# Patient Record
Sex: MALE | Race: WHITE
[De-identification: names, ages, dates, MRNs, and addresses within clinical notes are randomized per-mention and may not be internally consistent; named-entity substitution may affect disease eponyms.]

---

## 2017-09-26 LAB
ANION GAP SERPL CALC-SCNC: 11 MMOL/L (ref 3–11)
APPEARANCE UR: CLEAR
BASOPHILS # BLD: 0.01 K/UL (ref 0–0.2)
BASOPHILS NFR BLD: 0.2 %
BILIRUB UR-MCNC: (no result) MG/DL
BUN SERPL-MCNC: 10 MG/DL (ref 7–18)
BUN/CREAT SERPL: 14.6 (ref 10–20)
CALCIUM SERPL-MCNC: 9 MG/DL (ref 8.5–10.1)
CHLORIDE SERPL-SCNC: 105 MMOL/L (ref 98–107)
CO2 SERPL-SCNC: 24 MMOL/L (ref 21–32)
COLOR UR: YELLOW
COMPLETE: YES
CREAT CL PREDICTED SERPL C-G-VRATE: 142.7 ML/MIN
CREAT SERPL-MCNC: 0.67 MG/DL (ref 0.6–1.4)
EOSINOPHIL NFR BLD AUTO: 206 K/UL (ref 130–400)
GLUCOSE SERPL-MCNC: 84 MG/DL (ref 70–99)
HCT VFR BLD CALC: 44.4 % (ref 42–52)
IG%: 0.4 %
IMM GRANULOCYTES NFR BLD AUTO: 31.8 %
INR PPP: 1 (ref 0.9–1.1)
LYMPHOCYTES # BLD: 1.78 K/UL (ref 1.2–3.4)
MANUAL MICROSCOPIC REQUIRED?: NO
MCH RBC QN AUTO: 30 PG (ref 25–34)
MCHC RBC AUTO-ENTMCNC: 33.6 G/DL (ref 32–36)
MCV RBC AUTO: 89.3 FL (ref 80–100)
MONOCYTES NFR BLD: 8.8 %
NEUTROPHILS # BLD AUTO: 1.8 %
NEUTROPHILS NFR BLD AUTO: 57 %
NITRITE UR QL STRIP: (no result)
PARTIAL THROMBOPLASTIN RATIO: 1.1
PH UR STRIP: 7 [PH] (ref 4.5–7.5)
PMV BLD AUTO: 8.6 FL (ref 7.4–10.4)
POTASSIUM SERPL-SCNC: 3.8 MMOL/L (ref 3.5–5.1)
PROTHROMBIN TIME: 10.8 SECONDS (ref 9–12)
RBC # BLD AUTO: 4.97 M/UL (ref 4.7–6.1)
REVIEW REQ?: NO
SODIUM SERPL-SCNC: 140 MMOL/L (ref 136–145)
SP GR UR STRIP: 1.02 (ref 1–1.03)
URINE BILL WITH OR WITHOUT MIC: (no result)
UROBILINOGEN UR-MCNC: (no result) MG/DL
WBC # BLD AUTO: 5.59 K/UL (ref 4.8–10.8)
ZZUR CULT IF INDIC CLEAN CATCH: NO

## 2017-09-26 NOTE — PAT MEDICATION INSTRUCTIONS
Service Date


Sep 26, 2017.





Current Home Medication List


Amoxicillin (Amoxil), 4 CAP PO AS DIRECTED PRN for dental


Aspirin (Aspirin Chewable), 81 MG PO QAM


Diphenhydramine Hcl (Benadryl Allergy), 1 CAP PO HS


Fish Oil ([place]-3), 1 CAP PO QAM


Multiple Vitamins W/ Minerals (Centrum Silver Ultra Mens), 1 TAB PO QAM


Simvastatin (Zocor), 20 MG PO QPM


Tadalafil (Cialis), 1 TAB PO QAM





Medication Instructions


For Your Scheduled Surgery 





- Hold the following medications 2 weeks prior to surgery:


Fish Oil ([place]-3), 1 CAP PO QAM








-Continue as directed:


Amoxicillin (Amoxil), 4 CAP PO AS DIRECTED PRN for dental








- Hold the following medications the morning of surgery:


Multiple Vitamins W/ Minerals (Centrum Silver Ultra Mens), 1 TAB PO QAM








- Take the following medications the morning of surgery with a sip of water:


Aspirin (Aspirin Chewable), 81 MG PO QAM


Tadalafil (Cialis), 1 TAB PO QAM








- Take the following medications as scheduled the night before surgery:


Diphenhydramine Hcl (Benadryl Allergy), 1 CAP PO HS


Simvastatin (Zocor), 20 MG PO QPM











If you have any questions please call us at [phone] or [phone] or 

[phone]

## 2017-09-26 NOTE — DIAGNOSTIC IMAGING REPORT
CHEST PREADMISSION(PA/LAT)



HISTORY:  57 years-old Male PAT preadmission exam. No acute chest complaints.

Initial exam.



COMPARISON: None available.



TECHNIQUE: Frontal and lateral views of the chest.



FINDINGS: 

Cardiac silhouette is upper limits of normal. There is no pneumothorax, pleural

effusion or focal airspace consolidation. No overt pulmonary edema.



The bones are grossly intact. 



IMPRESSION: No acute cardiopulmonary process. 







The above report was generated using voice recognition software. It may contain

grammatical, syntax or spelling errors.







Electronically signed by:  Emigdio Davis M.D.

9/26/2017 2:56 PM



Dictated Date/Time:  9/26/2017 2:55 PM

## 2017-09-27 LAB — EST. AVERAGE GLUCOSE BLD GHB EST-MCNC: 117 MG/DL

## 2017-10-24 NOTE — HISTORY & PHYSICAL EXAMINATION
DATE OF ADMISSION:  10/25/2017

 

ADMISSION HISTORY AND PHYSICAL

 

CHIEF COMPLAINT:  Chronic left knee pain.

 

HISTORY OF PRESENT ILLNESS:  This is a 57-year-old male patient of Dr. Kohler's complaining of chronic left knee pain, longstanding, now

progressively getting worse.  He has failed conservative treatment including

intra-articular injections and anti-inflammatories.  The patient has

increased pain with weightbearing activities and his pain does interfere with

his activities of daily living.

 

PAST MEDICAL HISTORY:  He was born with a congenital heart disease, heart

murmur, heart valve problem, hypercholesterolemia, irregular heartbeat, sleep

apnea with the use of CPAP, osteoarthritis, spine problems, neck problems,

and obesity.

 

SOCIAL HISTORY:  Nonsmoker, nondrinker.

 

FAMILY HISTORY:  Noncontributory.

 

REVIEW OF SYSTEMS:  The patient complains of chronic left knee pain,

otherwise denies any shortness of breath, chest pain, nausea, vomiting or any

other joint complaints.

 

PAST SURGICAL HISTORY:  Shoulder surgery, knee surgery, back surgery.

 

MEDICATIONS:  Simvastatin 20 mg daily at bedtime, Cialis 5 mg daily as

needed, aspirin 81 mg daily, amoxicillin only prophylactically for dental

work, fish oil 1000 mg daily, Centrum multivitamin daily.

 

ALLERGIES:  OXYCODONE WHICH CAUSES puritis

 

PHYSICAL EXAMINATION:

GENERAL:  Well-developed, well-nourished 57-year-old male in no acute

distress.  He is alert and oriented x3 and pleasant.

HEENT:  Normocephalic, atraumatic.  Extraocular motions are intact.  Pupils

are equal and reactive to light.

HEART:  Regular rate and rhythm with a 1/6 murmur appreciated.

LUNGS:  Clear.

ABDOMEN:  Soft and nontender, bowel sounds are present.

EXTREMITIES:  Left knee reveals medial joint line tenderness with a varus

deformity.  He has crepitation and pain with passive range of motion.  He has

limited range of motion of 0-120 degrees.  He has 5/5 strength.

NEUROLOGIC:  Neurovascularly, he is intact in his left lower extremity.

 

DIAGNOSES:  Left knee end-stage osteoarthritis.  He has a congenital heart

problem, heart murmur, heart valve problem, hypercholesterolemia, irregular

heartbeat, sleep apnea with the use of CPAP, osteoarthritis, spine problems,

neck problems, and obesity.

 

PLAN:  The patient was advised of his diagnosis.  Indications, risks,

benefits, and postop course have all been reviewed.  The patient wishes to

proceed with a left total knee arthroplasty.  Necessary consent forms,

preoperative testing and clearances will be obtained.

 

 

 

[facility]

## 2017-10-25 ENCOUNTER — HOSPITAL ENCOUNTER (INPATIENT)
Dept: HOSPITAL 45 - C.ACU | Age: 57
LOS: 2 days | Discharge: HOME | DRG: 470 | End: 2017-10-27
Attending: ORTHOPAEDIC SURGERY | Admitting: ORTHOPAEDIC SURGERY
Payer: COMMERCIAL

## 2017-10-25 VITALS
DIASTOLIC BLOOD PRESSURE: 74 MMHG | SYSTOLIC BLOOD PRESSURE: 165 MMHG | OXYGEN SATURATION: 94 % | HEART RATE: 78 BPM | TEMPERATURE: 97.88 F

## 2017-10-25 VITALS
HEART RATE: 65 BPM | DIASTOLIC BLOOD PRESSURE: 81 MMHG | TEMPERATURE: 97.88 F | OXYGEN SATURATION: 95 % | SYSTOLIC BLOOD PRESSURE: 144 MMHG

## 2017-10-25 VITALS
SYSTOLIC BLOOD PRESSURE: 140 MMHG | TEMPERATURE: 98.24 F | OXYGEN SATURATION: 93 % | DIASTOLIC BLOOD PRESSURE: 100 MMHG | HEART RATE: 97 BPM

## 2017-10-25 VITALS
DIASTOLIC BLOOD PRESSURE: 81 MMHG | HEART RATE: 65 BPM | TEMPERATURE: 97.7 F | SYSTOLIC BLOOD PRESSURE: 166 MMHG | OXYGEN SATURATION: 95 %

## 2017-10-25 VITALS — OXYGEN SATURATION: 96 % | DIASTOLIC BLOOD PRESSURE: 80 MMHG | SYSTOLIC BLOOD PRESSURE: 147 MMHG | HEART RATE: 64 BPM

## 2017-10-25 VITALS
DIASTOLIC BLOOD PRESSURE: 87 MMHG | OXYGEN SATURATION: 99 % | HEART RATE: 84 BPM | SYSTOLIC BLOOD PRESSURE: 139 MMHG | TEMPERATURE: 98.06 F

## 2017-10-25 VITALS — OXYGEN SATURATION: 92 % | SYSTOLIC BLOOD PRESSURE: 158 MMHG | HEART RATE: 61 BPM | DIASTOLIC BLOOD PRESSURE: 82 MMHG

## 2017-10-25 VITALS
OXYGEN SATURATION: 94 % | TEMPERATURE: 97.88 F | DIASTOLIC BLOOD PRESSURE: 83 MMHG | HEART RATE: 103 BPM | SYSTOLIC BLOOD PRESSURE: 169 MMHG

## 2017-10-25 VITALS
WEIGHT: 246.26 LBS | HEIGHT: 66 IN | BODY MASS INDEX: 39.58 KG/M2 | HEIGHT: 66 IN | WEIGHT: 246.26 LBS | BODY MASS INDEX: 39.58 KG/M2

## 2017-10-25 VITALS — DIASTOLIC BLOOD PRESSURE: 79 MMHG | HEART RATE: 88 BPM | SYSTOLIC BLOOD PRESSURE: 148 MMHG

## 2017-10-25 VITALS — DIASTOLIC BLOOD PRESSURE: 79 MMHG | SYSTOLIC BLOOD PRESSURE: 157 MMHG

## 2017-10-25 DIAGNOSIS — M17.0: Primary | ICD-10-CM

## 2017-10-25 DIAGNOSIS — E78.00: ICD-10-CM

## 2017-10-25 DIAGNOSIS — G47.33: ICD-10-CM

## 2017-10-25 DIAGNOSIS — Z83.3: ICD-10-CM

## 2017-10-25 DIAGNOSIS — Z79.899: ICD-10-CM

## 2017-10-25 DIAGNOSIS — E66.9: ICD-10-CM

## 2017-10-25 DIAGNOSIS — Z79.82: ICD-10-CM

## 2017-10-25 DIAGNOSIS — Z80.42: ICD-10-CM

## 2017-10-25 DIAGNOSIS — Q23.1: ICD-10-CM

## 2017-10-25 DIAGNOSIS — E78.5: ICD-10-CM

## 2017-10-25 PROCEDURE — 0SRD0J9 REPLACEMENT OF LEFT KNEE JOINT WITH SYNTHETIC SUBSTITUTE, CEMENTED, OPEN APPROACH: ICD-10-PCS | Performed by: ORTHOPAEDIC SURGERY

## 2017-10-25 RX ADMIN — SIMVASTATIN SCH MG: 20 TABLET, FILM COATED ORAL at 20:52

## 2017-10-25 RX ADMIN — DEXTROSE MONOHYDRATE, SODIUM CHLORIDE, AND POTASSIUM CHLORIDE SCH MLS/HR: 50; 4.5; 1.49 INJECTION, SOLUTION INTRAVENOUS at 20:51

## 2017-10-25 RX ADMIN — FERROUS GLUCONATE SCH MG: 324 TABLET ORAL at 18:42

## 2017-10-25 RX ADMIN — SODIUM CHLORIDE SCH MG: 9 INJECTION INTRAMUSCULAR; INTRAVENOUS; SUBCUTANEOUS at 18:41

## 2017-10-25 RX ADMIN — CEFAZOLIN SCH MLS/MIN: 10 INJECTION, POWDER, FOR SOLUTION INTRAVENOUS at 16:19

## 2017-10-25 RX ADMIN — DEXTROSE MONOHYDRATE, SODIUM CHLORIDE, AND POTASSIUM CHLORIDE SCH MLS/HR: 50; 4.5; 1.49 INJECTION, SOLUTION INTRAVENOUS at 11:09

## 2017-10-25 RX ADMIN — DOCUSATE SODIUM SCH MG: 100 CAPSULE, LIQUID FILLED ORAL at 20:52

## 2017-10-25 RX ADMIN — Medication SCH MG: at 20:52

## 2017-10-25 RX ADMIN — FERROUS GLUCONATE SCH MG: 324 TABLET ORAL at 13:07

## 2017-10-25 RX ADMIN — STANDARDIZED SENNA CONCENTRATE SCH MG: 8.6 TABLET ORAL at 20:52

## 2017-10-25 RX ADMIN — SODIUM CHLORIDE SCH MG: 9 INJECTION INTRAMUSCULAR; INTRAVENOUS; SUBCUTANEOUS at 11:40

## 2017-10-25 NOTE — MEDICAL CONSULT
Consultation


Date of Consultation:


Oct 25, 2017.


Attending Physician:


Clif Kohler M.D.


Reason for Consultation:


Post Op Medical Management


History of Present Illness


57 year old male who is s/p left TKA today by Dr. Kohler. Patient reports 

increasing knee pain for the past several years. He failed outpatient 

conservative measures and therefore presented for the planned procedure today. 

Post operatively the patient is doing well. He reports his pain is well 

controlled. He reports some mild residual tingling to the BLLE. He denies chest 

pain and shortness of breath. No lightheadedness or dizziness. He denies 

abdominal pain and nausea. He reports he has no voided yet since surgery.





Past Medical/Surgical History


Medical Problems:


(1) Aortic regurgitation


Status: Chronic  





(2) HLD (hyperlipidemia)


Status: Chronic  





(3) [place] on CPAP


Status: Chronic  





Surgical Problems:


(1) H/O arthroscopic knee surgery


Status: Chronic  





(2) H/O laminectomy


Status: Chronic  





(3) H/O vasectomy


Status: Chronic  





(4) History of hemorrhoidectomy


Status: Chronic  





(5) S/P rotator cuff repair


Status: Chronic  








Family History





Diabetes mellitus


  FATHER


FH: prostate cancer


  FATHER





Social History


Smoking Status:  Never Smoker


Alcohol Use:  ~ 5 drinks / week


Marital Status:  


Occupation Status:  employed





Allergies


Coded Allergies:  


     Oxycodone (Verified  Adverse Reaction, Unknown, hallucinations, 10/25/17)





Home Medications


Benadryl Allergy (Diphenhydramine Hcl) 25 Mg Cap 1 Cap PO HS 30 Days


Centrum Silver Ultra Mens (Multiple Vitamins W/ Minerals) 1 Tab Tab 1 Tab PO QAM


Omega-3 (Fish Oil) 1 Ea Cap 1 Cap PO QAM


Amoxil (Amoxicillin) 500 Mg Cap 4 Cap PO AS DIRECTED PRN 10 Days


Aspirin Chewable (Aspirin) 81 Mg Chew 81 Mg PO QAM


Cialis (Tadalafil) 5 Mg Tab 1 Tab PO QAM 30 Days


Zocor (Simvastatin) 20 Mg Tab 20 Mg PO QPM





Current Inpatient Medications





Current Inpatient Medications








 Medications


  (Trade)  Dose


 Ordered  Sig/Vaishnavi


 Route  Start Time


 Stop Time Status Last Admin


Dose Admin


 


 Simvastatin


  (Zocor Tab)  20 mg  QPM


 PO  10/25/17 21:00


 11/24/17 20:59   


 


 


 Morphine Sulfate


  (MoRPHine


 SULFATE INJ)  2 mg  Q4HWA  PRN


 IV  10/25/17 09:30


 11/8/17 09:29   


 


 


 Potassium


 Chloride/Dextrose/


 Sod Cl  1,000 ml @ 


 100 mls/hr  Q10H


 IV  10/25/17 11:00


 10/26/17 09:28  10/25/17 11:09


100 MLS/HR


 


 Cefazolin Sodium


 2000 mg/Syringe  10 ml @ 


 2.5 mls/min  Q8H


 IV  10/25/17 16:00


 10/26/17 00:03   


 


 


 Ketorolac


 Tromethamine


  (Toradol Inj)  30 mg  Q6H


 IV.  10/25/17 12:00


 10/26/17 09:29   


 


 


 Celecoxib


  (CeleBREX CAP)  200 mg  BID


 PO  10/26/17 21:00


 11/25/17 20:59   


 


 


 Acetaminophen/


 Hydrocodone Bitart


  (Norco 5/325 Tab)  1 TABLET


 FOR PAIN


 RATING...  Q4H  PRN


 PO  10/25/17 09:30


 11/8/17 09:29   


 


 


 Magnesium


 Hydroxide


  (Milk Of


 Magnesia Susp)  30 ml  Q6H  PRN


 PO  10/25/17 09:30


 11/24/17 09:29   


 


 


 Bisacodyl


  (Dulcolax Supp)  10 mg  DAILY  PRN


 [place]  10/25/17 09:30


 11/24/17 09:29   


 


 


 Senna


  (Senokot Tab)  17.2 mg  HS


 PO  10/25/17 21:00


 11/24/17 20:59   


 


 


 Docusate Sodium


  (coLACE CAP)  100 mg  BID


 PO  10/25/17 21:00


 11/24/17 20:59   


 


 


 Al Hydrox/Mg


 Hydrox/Simethicone


  (Maalox Max Susp)  15 ml  Q4H  PRN


 PO  10/25/17 09:30


 11/24/17 09:29   


 


 


 Multivitamins


  (Multivitamin


 Tab)  1 tab  QAM


 PO  10/26/17 09:00


 11/25/17 08:59   


 


 


 Ondansetron HCl


  (Zofran Inj)  4 mg  Q6H  PRN


 IV  10/25/17 09:30


 11/24/17 09:29   


 


 


 Ferrous Gluconate


  (Ferrous


 Gluconate Tab)  324 mg  TIDM


 PO  10/25/17 12:30


 11/24/17 12:29   


 


 


 Pantoprazole


 Sodium


  (Protonix Tab)  40 mg  QAM


 PO  10/26/17 09:00


 11/25/17 08:59   


 


 


 Tamsulosin HCl


  (Flomax Cap)  0.4 mg  QAM  PRN


 PO  10/25/17 09:30


 11/24/17 09:29   


 


 


 Tramadol HCl


  (Ultram Tab)  1 tablet


 for pain


 rating...  Q4H  PRN


 PO  10/25/17 09:30


 11/24/17 09:29   


 


 


 Aspirin


  (Ecotrin Tab)  81 mg  BID


 PO  10/25/17 21:00


 11/24/17 20:59   


 


 


 Morphine Sulfate


  (MoRPHine


 SULFATE INJ)  4 mg  Q4HWA  PRN


 IV  10/25/17 10:45


 11/8/17 10:44   


 


 


 Morphine Sulfate


  (MoRPHine


 SULFATE INJ)  6 mg  Q4HWA  PRN


 IV  10/25/17 10:45


 11/8/17 10:44   


 











Review of Systems


ROS per HPI, all other systems reviewed and negative





Physical Exam











  Date Time  Temp Pulse Resp B/P (MAP) Pulse Ox O2 Delivery O2 Flow Rate FiO2


 


10/25/17 11:06  61 16 158/82 (107) 92 Room Air  


 


10/25/17 10:40  64 16 147/80 (102) 96 Room Air  


 


10/25/17 10:00 36.1 65 19 138/76 95 Nasal Cannula 2 


 


10/25/17 09:50  66 16 144/74 94 Nasal Cannula 2 


 


10/25/17 09:40  70 17 143/78 95 Nasal Cannula 2 


 


10/25/17 09:30  72 22 132/76 99 Oxymask 10 


 


10/25/17 09:24 36.9 70 16 125/70 98 Oxymask 10 


 


10/25/17 05:46 36.5 65 20 166/81 95 Room Air  








General Appearance:  WD/WN, no apparent distress


Head:  normocephalic, atraumatic


Eyes:  normal inspection, EOMI, sclerae normal


ENT:  hearing grossly normal, + pertinent finding (moist mucous membranes)


Neck:  supple, no JVD, trachea midline


Respiratory/Chest:  lungs clear, normal breath sounds, no respiratory distress


Cardiovascular:  regular rate, rhythm, no edema, normal peripheral pulses


Abdomen/GI:  normal bowel sounds, non tender, soft, no organomegaly


Extremities/Musculoskelatal:  normal capillary refill, no pedal edema, + 

pertinent finding (s/p left knee surgery, surgical dressing intact, drain in 

place draining bloody drainage, CSM checks intact to LLE)


Neurologic/Psych:  no motor/sensory deficits, alert, normal mood/affect, 

oriented x 3


Skin:  normal color, warm/dry





Assessment & Plan


S/P LEFT TKA


- POD#0


- activity and wound care orders as per ortho


- pain control with bowel regimen


- PT/OT


- monitor H/H for acute blood loss anemia and transfuse blood products PRN





[place] ON CPAP


- patient declining to use CPAP while hospitalized 


- discussed with patient that he may need it while utilizing narcotics for pain 

control 


- will try nocturnal O2 for now 





HLD


- continue statin 


- holding fish oil 





AORTIC REGURGITATION


- no acute issues 





DVT PROPHYLAXIS


- ASA 81mg BID per ortho 





Thank you for this consultation.


We will follow the patient with you during their hospital stay.


You can reach a member of the [facility] Hospitalist Team 24/7 via pager @ [phone].





ADDENDUM:





This is a 57 year old male with a PMH of HLD, [place] on CPAP - presented for L 

knee TKA


Doing well post-operatively


No pain


Good PO intake


No BM yet


Denies fevers/chills or any other symptoms


PT/OT as per ortho


aspirin 81mg BID


hold fish oils, monitor H/H

## 2017-10-25 NOTE — DIAGNOSTIC IMAGING REPORT
TWO VIEWS LEFT KNEE



CLINICAL HISTORY:  Postoperative examination.



FINDINGS: AP and crosstable lateral portable views of the left knee are

obtained. A left knee arthroplasty is in near anatomic alignment. There has been

undersurface remodeling of the patella. No acute fracture is seen. There are

expected postoperative changes around the knee including skin clips, a surgical

drain, soft tissue edema, and subcutaneous gas.



IMPRESSION: Expected postoperative changes status post left knee arthroplasty.

No acute fracture is seen.







Electronically signed by:  Gil Corrales M.D.

10/25/2017 10:00 AM



Dictated Date/Time:  10/25/2017 10:00 AM

## 2017-10-25 NOTE — OPERATIVE REPORT
DATE OF OPERATION:  10/25/2017

 

INDICATION FOR PROCEDURE:  The patient is a 57-year-old male who presents

with progressive osteoarthritis in both of his knees with the left knee more

painful than the right.  Radiographs demonstrate he has bilateral knee medial

compartment OA.  He is clearly bone on bone on the left knee and close to if 
not bone on bone in

the medial compartment on the right knee as well.  He has already had

previous arthroscopic surgery on the left knee.  Now presents for total knee

replacement.

 

PREOPERATIVE DIAGNOSIS:  End-stage osteoarthritis, left knee.

 

POSTOPERATIVE DIAGNOSIS:  Same.

 

PROCEDURE:  Left total knee arthroplasty.

 

SURGEON:  Dr. Kohler.

 

ASSISTANT:  GENE Curran.

 

ANESTHESIA:  Spinal adductor nerve block sedation and Orthomix.

 

OPERATIVE PROCEDURE:  The patient was taken to the operating room,

anesthetized under anesthesia as dictated.  He was placed supine on the

operating room table.  Pneumatic tourniquet was placed in the left upper

thigh.  Left lower extremity was prepped and draped in sterile fashion.  Exam

demonstrated that he did not have an effusion.  He had scars from previous

arthroscopic surgery.  He did not have any pseudolaxity medially, had a varus

knee and 0 through 120 degrees of range of motion.  The left lower extremity

was sterilely prepped and draped with ChloraPrep.  The leg was elevated,

exsanguinated with Esmarch bandage.  Pneumatic tourniquet was raised to 325

mmHg.  Anterior incision made across the left knee.  Skin was incised

sharply.  The patient did have some thickened prepatellar bursa which was

resected.  Subcutaneous flaps were elevated.  Incision was made through

medial retinaculum, extended up in the mid third of the quadriceps tendon,

extending down to the medial tibial tubercle.  Intraarticular findings

demonstrated he had some tricompartmental DJD but mainly medial compartment

was bone on bone and some grade 3 medial patellofemoral DJD.  I used the

[employer] Nephew Journey 2.0 total knee arthroplasty system using CleanTiee MR

templating.  He was templated for a 5 femur and 4 tibia, but we sized the

tibia for a 5 intraoperatively.  The knee was exposed by excising the

infrapatellar fat pad, the fat pad over the anterior femur for placement of

the component in that area.  I released the lateral synovial bands.  The

cruciate ligaments were resected.  He already had a medial meniscectomy

performed, so the remainder of the meniscal remnants on the medial side were

resected and the lateral meniscus was resected.  We had to do medial releases

around the medial tibial plateau to balance the ligaments due to tight medial

compartment.  The femur was exposed.  The custom femoral cutting block was

pinned in position and the distal femoral cut was made.  Then the 5-in-1

cutting block was placed and anterior, posterior and chamfer cuts were made

for a size 5 femur.  All osteophytes were removed.  Then the knee was

extended and a subperiosteal peel lateral release was performed around the

patella and the patient had an oval patella.  So we went ahead with an oval

component for the patella.  The width was measured and width was reproduced

using a freehand cut technique and we used the 32 oval patella, made the

drill holes for the patella.  The tibia was then exposed and subluxed and the

tibial cutting block was pinned in position and the proximal tibial cut was

made.  Then the tibia was sized for a 5 tibia component.  This was externally

rotated in line with the tibial tubercle and pinned in position and the punch

for the stem was used.  Then the 5 implant was centered and inserted fully

and then the notch cutting devices were used and the collet was placed.  Then

we assessed ligamentous balance.  There was too much asymmetry with lax

lateral collateral ligament ____ the medial side, even for the asymmetrical

component being placed.  So we went ahead and used the lamina  and

pie crusted the MCL to get better ligamentous balance.  At this point, we

placed a 13 insert and we tried the high flex insert technique through range

of motion and there was a little bit of laxity in flexion of the MCL, but had

complete stability in extension side to side, used a constrained component to

prevent any mid flexion or flexion instability.  The 13 constrained had

balanced ligaments through full range of motion.  The patella just had a

little slight liftoff at the maximum deflexion, so we went ahead and did a

lateral release, leaving the synovium intact and then the patella tracked

centrally.  The trials were removed.  The Orthomix anesthetic cocktail was

injected per protocol.  The knee was copiously irrigated with pulsatile

lavage antibiotic solution with bacitracin.  Then the final components were

cemented with Simplex G cement.  The final components were the Smith [employer] Nephew

Journey 2.0 Oxinium left posterior stabilized 5 femoral component, the 5

tibial component and the 13 constrained polyethylene in the tibia and a 32

oval patella.  All cement cured, the Betadine soak was used.  The knee was

copiously irrigated with antibiotic solution and bacitracin.  Two drains were

brought out laterally.  Then the quadriceps tendon and medial retinaculum

were closed with interrupted figure-of-eight #1 Vicryl sutures.  Subcutaneous

tissues were closed with interrupted 2-0 Vicryl sutures, skin was closed with

staples.  Sterile dressings were applied.  We did do a closure over 2 Hemovac

drains which were brought out laterally.  The repair was secured through full

range of motion.  The patient tolerated the procedure well.  He had minimal

blood loss.  GENE Curran was my first assistant.  He functioned as

first assistant for the entire procedure.  He assisted in soft tissue

retraction, instrument management and performed the fascial, subcutaneous and

skin closure.  He will participate in postoperative care of the patient.

 

 

I attest to the content of the Intraoperative Record and any orders documented 
therein. Any exceptions are noted below.

 

 

 

SILVINO

## 2017-10-25 NOTE — ANESTHESIOLOGY PROGRESS NOTE
Anesthesia Post Op Note


Date & Time


Oct 25, 2017 at 09:59





Vital Signs


Pain Intensity:  0





Vital Signs Past 12 Hours








  Date Time  Temp Pulse Resp B/P (MAP) Pulse Ox O2 Delivery O2 Flow Rate FiO2


 


10/25/17 09:50  66 16 144/74 94 Nasal Cannula 2 


 


10/25/17 09:40  70 17 143/78 95 Nasal Cannula 2 


 


10/25/17 09:30  72 22 132/76 99 Oxymask 10 


 


10/25/17 09:24 36.9 70 16 125/70 98 Oxymask 10 


 


10/25/17 05:46 36.5 65 20 166/81 95 Room Air  











Notes


Mental Status:  alert / awake / arousable, participated in evaluation


Pt Amnestic to Procedure:  Yes


Nausea / Vomiting:  adequately controlled


Pain:  adequately controlled


Airway Patency, RR, SpO2:  stable & adequate


BP & HR:  stable & adequate


Hydration State:  stable & adequate


Neuraxial Anesthesia:  was administered, sensory block is resolving


Anesthetic Complications:  no major complications apparent


Doing well. VSS. Pain controlled. Ready for d/c from PACU

## 2017-10-25 NOTE — [facility] POST OPERATIVE BRIEF NOTE
Immediate Operative Summary


Operative Date


Oct 25, 2017.





Pre-Operative Diagnosis





Left Knee Degenerative Joint Disease





Post-Operative Diagnosis





Left Knee Degenerative Joint Disease





Procedure(s) Performed





Left Total Knee Arthroplasty





Surgeon


Dr. Kohler





Assistant Surgeon(s)


GENE Curran





Estimated Blood Loss


5 ml





Findings


djd oa varus grade 4 medial grade 3 medial patella





Specimens





A. Left Knee Bone and Tissue





Drains


2 hemovac





Anesthesia


spinal sedation adductor block orthomix





Complication(s)


None





Disposition


Recovery Room / PACU

## 2017-10-26 VITALS
OXYGEN SATURATION: 96 % | HEART RATE: 58 BPM | SYSTOLIC BLOOD PRESSURE: 163 MMHG | DIASTOLIC BLOOD PRESSURE: 85 MMHG | TEMPERATURE: 97.52 F

## 2017-10-26 VITALS — DIASTOLIC BLOOD PRESSURE: 78 MMHG | SYSTOLIC BLOOD PRESSURE: 140 MMHG

## 2017-10-26 VITALS
DIASTOLIC BLOOD PRESSURE: 67 MMHG | OXYGEN SATURATION: 97 % | HEART RATE: 60 BPM | TEMPERATURE: 97.7 F | SYSTOLIC BLOOD PRESSURE: 152 MMHG

## 2017-10-26 VITALS
TEMPERATURE: 96.26 F | HEART RATE: 64 BPM | DIASTOLIC BLOOD PRESSURE: 64 MMHG | SYSTOLIC BLOOD PRESSURE: 146 MMHG | OXYGEN SATURATION: 96 %

## 2017-10-26 VITALS
DIASTOLIC BLOOD PRESSURE: 81 MMHG | TEMPERATURE: 97.52 F | HEART RATE: 63 BPM | SYSTOLIC BLOOD PRESSURE: 147 MMHG | OXYGEN SATURATION: 98 %

## 2017-10-26 VITALS
SYSTOLIC BLOOD PRESSURE: 146 MMHG | DIASTOLIC BLOOD PRESSURE: 83 MMHG | TEMPERATURE: 97.52 F | HEART RATE: 66 BPM | OXYGEN SATURATION: 98 %

## 2017-10-26 VITALS
OXYGEN SATURATION: 95 % | HEART RATE: 94 BPM | TEMPERATURE: 97.88 F | DIASTOLIC BLOOD PRESSURE: 83 MMHG | SYSTOLIC BLOOD PRESSURE: 146 MMHG

## 2017-10-26 VITALS — OXYGEN SATURATION: 96 %

## 2017-10-26 LAB
ANION GAP SERPL CALC-SCNC: 8 MMOL/L (ref 3–11)
BUN SERPL-MCNC: 9 MG/DL (ref 7–18)
BUN/CREAT SERPL: 11.6 (ref 10–20)
CALCIUM SERPL-MCNC: 8.2 MG/DL (ref 8.5–10.1)
CHLORIDE SERPL-SCNC: 106 MMOL/L (ref 98–107)
CO2 SERPL-SCNC: 24 MMOL/L (ref 21–32)
CREAT CL PREDICTED SERPL C-G-VRATE: 127.5 ML/MIN
CREAT SERPL-MCNC: 0.75 MG/DL (ref 0.6–1.4)
EOSINOPHIL NFR BLD AUTO: 185 K/UL (ref 130–400)
GLUCOSE SERPL-MCNC: 138 MG/DL (ref 70–99)
HCT VFR BLD CALC: 37.6 % (ref 42–52)
MCH RBC QN AUTO: 31 PG (ref 25–34)
MCHC RBC AUTO-ENTMCNC: 34.6 G/DL (ref 32–36)
MCV RBC AUTO: 89.5 FL (ref 80–100)
PMV BLD AUTO: 9 FL (ref 7.4–10.4)
POTASSIUM SERPL-SCNC: 4.1 MMOL/L (ref 3.5–5.1)
RBC # BLD AUTO: 4.2 M/UL (ref 4.7–6.1)
SODIUM SERPL-SCNC: 139 MMOL/L (ref 136–145)
WBC # BLD AUTO: 13.63 K/UL (ref 4.8–10.8)

## 2017-10-26 RX ADMIN — HYDROCODONE BITARTRATE AND ACETAMINOPHEN PRN TAB: 5; 325 TABLET ORAL at 17:41

## 2017-10-26 RX ADMIN — FERROUS GLUCONATE SCH MG: 324 TABLET ORAL at 08:33

## 2017-10-26 RX ADMIN — DEXTROSE MONOHYDRATE, SODIUM CHLORIDE, AND POTASSIUM CHLORIDE SCH MLS/HR: 50; 4.5; 1.49 INJECTION, SOLUTION INTRAVENOUS at 05:52

## 2017-10-26 RX ADMIN — HYDROCODONE BITARTRATE AND ACETAMINOPHEN PRN TAB: 5; 325 TABLET ORAL at 12:25

## 2017-10-26 RX ADMIN — Medication SCH TAB: at 08:34

## 2017-10-26 RX ADMIN — STANDARDIZED SENNA CONCENTRATE SCH MG: 8.6 TABLET ORAL at 21:27

## 2017-10-26 RX ADMIN — SODIUM CHLORIDE SCH MG: 9 INJECTION INTRAMUSCULAR; INTRAVENOUS; SUBCUTANEOUS at 00:20

## 2017-10-26 RX ADMIN — SIMVASTATIN SCH MG: 20 TABLET, FILM COATED ORAL at 21:26

## 2017-10-26 RX ADMIN — FERROUS GLUCONATE SCH MG: 324 TABLET ORAL at 17:41

## 2017-10-26 RX ADMIN — SODIUM CHLORIDE SCH MG: 9 INJECTION INTRAMUSCULAR; INTRAVENOUS; SUBCUTANEOUS at 05:52

## 2017-10-26 RX ADMIN — PANTOPRAZOLE SCH MG: 40 TABLET, DELAYED RELEASE ORAL at 08:34

## 2017-10-26 RX ADMIN — DOCUSATE SODIUM SCH MG: 100 CAPSULE, LIQUID FILLED ORAL at 21:26

## 2017-10-26 RX ADMIN — HYDROCODONE BITARTRATE AND ACETAMINOPHEN PRN TAB: 5; 325 TABLET ORAL at 22:17

## 2017-10-26 RX ADMIN — Medication SCH MG: at 21:26

## 2017-10-26 RX ADMIN — FERROUS GLUCONATE SCH MG: 324 TABLET ORAL at 12:21

## 2017-10-26 RX ADMIN — CELECOXIB SCH MG: 200 CAPSULE ORAL at 21:26

## 2017-10-26 RX ADMIN — CEFAZOLIN SCH MLS/MIN: 10 INJECTION, POWDER, FOR SOLUTION INTRAVENOUS at 00:30

## 2017-10-26 RX ADMIN — Medication SCH MG: at 08:34

## 2017-10-26 RX ADMIN — DOCUSATE SODIUM SCH MG: 100 CAPSULE, LIQUID FILLED ORAL at 08:34

## 2017-10-26 NOTE — ORTHOPEDIC PROGRESS NOTE
Orthopedic Progress Note


Date of Service


Oct 26, 2017.





Subjective


Post OP Day:  1


Reports: feeling well, pain controlled w PO medications, Denies: complaints, 

chest pain, SOB, nausea / vomiting, light headedness, calf pain





Objective


calves soft nontender, N/V intact, capillary refill less than 2 sec., dressing C

/D/I, A&O x3, toes mobile











  Date Time  Temp Pulse Resp B/P (MAP) Pulse Ox O2 Delivery O2 Flow Rate FiO2


 


10/26/17 07:29 36.4 63 16 147/81 (103) 98 Room Air  


 


10/26/17 07:10      Room Air  


 


10/26/17 03:30 36.6 94 18 146/83 (104) 95 Room Air  


 


10/26/17 00:20      Room Air  


 


10/25/17 23:00 36.7 84 20 139/87 (104) 99 Room Air  


 


10/25/17 19:31    157/79 (105)    


 


10/25/17 19:27 36.8 97 18 140/100 (113) 93 Room Air  


 


10/25/17 15:54  88  148/79 (102)    


 


10/25/17 15:32 36.6 103 18 169/83 (111) 94 Room Air  


 


10/25/17 15:20      Room Air  


 


10/25/17 13:02 36.6 78 19 165/74 (104) 94 Nasal Cannula 2.0 


 


10/25/17 11:06  61 16 158/82 (107) 92 Room Air  


 


10/25/17 10:40  64 16 147/80 (102) 96 Room Air  


 


10/25/17 10:10 36.6 65 18 144/81 (102) 95 Nasal Cannula 2.0 


 


10/25/17 10:10     95 Nasal Cannula 2.0 


 


10/25/17 10:10      Nasal Cannula 2.0 


 


10/25/17 10:00 36.1 65 19 138/76 95 Nasal Cannula 2 


 


10/25/17 09:50  66 16 144/74 94 Nasal Cannula 2 


 


10/25/17 09:40  70 17 143/78 95 Nasal Cannula 2 


 


10/25/17 09:30  72 22 132/76 99 Oxymask 10 








Laboratory Results 24 Hours:











Test


  10/26/17


06:16


 


Hematocrit 37.6 % 


 


Hemoglobin 13.0 g/dL 











Assessment & Plan


Assessment:


POD #1, Left TKA








Inhouse Planning


Pain Management:  Celebrex, Norco, Morphine, PO Tylenol


DVT Prophylaxis:  TEDs, SCDs, ASA





Discharge Planning


Discharge Planning:  home with oppt


Pain Management:  Celebrex, Norco, PO Tylenol


DVT Prophylaxis:  TEDs, ASA


Therapy:  Physical Therapy, Occupational Therapy

## 2017-10-27 VITALS
TEMPERATURE: 97.88 F | OXYGEN SATURATION: 95 % | SYSTOLIC BLOOD PRESSURE: 171 MMHG | HEART RATE: 68 BPM | DIASTOLIC BLOOD PRESSURE: 82 MMHG

## 2017-10-27 VITALS
TEMPERATURE: 97.88 F | SYSTOLIC BLOOD PRESSURE: 171 MMHG | HEART RATE: 68 BPM | OXYGEN SATURATION: 95 % | DIASTOLIC BLOOD PRESSURE: 82 MMHG

## 2017-10-27 VITALS — OXYGEN SATURATION: 95 %

## 2017-10-27 LAB
EOSINOPHIL NFR BLD AUTO: 161 K/UL (ref 130–400)
HCT VFR BLD CALC: 35.9 % (ref 42–52)
MCH RBC QN AUTO: 30.8 PG (ref 25–34)
MCHC RBC AUTO-ENTMCNC: 34.3 G/DL (ref 32–36)
MCV RBC AUTO: 89.8 FL (ref 80–100)
PMV BLD AUTO: 8.5 FL (ref 7.4–10.4)
RBC # BLD AUTO: 4 M/UL (ref 4.7–6.1)
WBC # BLD AUTO: 7.58 K/UL (ref 4.8–10.8)

## 2017-10-27 RX ADMIN — CELECOXIB SCH MG: 200 CAPSULE ORAL at 07:39

## 2017-10-27 RX ADMIN — HYDROCODONE BITARTRATE AND ACETAMINOPHEN PRN TAB: 5; 325 TABLET ORAL at 02:27

## 2017-10-27 RX ADMIN — DOCUSATE SODIUM SCH MG: 100 CAPSULE, LIQUID FILLED ORAL at 07:39

## 2017-10-27 RX ADMIN — FERROUS GLUCONATE SCH MG: 324 TABLET ORAL at 07:38

## 2017-10-27 RX ADMIN — Medication SCH MG: at 07:39

## 2017-10-27 RX ADMIN — PANTOPRAZOLE SCH MG: 40 TABLET, DELAYED RELEASE ORAL at 07:40

## 2017-10-27 RX ADMIN — HYDROCODONE BITARTRATE AND ACETAMINOPHEN PRN TAB: 5; 325 TABLET ORAL at 12:34

## 2017-10-27 RX ADMIN — HYDROCODONE BITARTRATE AND ACETAMINOPHEN PRN TAB: 5; 325 TABLET ORAL at 06:43

## 2017-10-27 RX ADMIN — FERROUS GLUCONATE SCH MG: 324 TABLET ORAL at 12:33

## 2017-10-27 RX ADMIN — Medication SCH TAB: at 07:39

## 2017-10-27 NOTE — DISCHARGE INSTRUCTIONS
Discharge Instructions


Date of Service


Oct 27, 2017.





Admission


Reason for Admission:  Left Knee Degenerative Joint Disease





Discharge


Discharge Diagnosis / Problem:  sp left TKA





Discharge Goals


Goal(s):  Decrease discomfort, Improve function, Increase independence





Activity Recommendations


Activity Limitations:  per Instructions/Follow-up section





.





Instructions / Follow-Up


Instructions / Follow-Up


ACTIVITY RECOMMENDATIONS:





SELF CARE INSTRUCTIONS AFTER TOTAL KNEE REPLACEMENT





A.  You may need to continue a physical therapy program after discharge from 

the hospital.  There are several options available to you. 


      Your doctor will assist you in selecting the best one for you.





   1.  An out-patient facility 2 to 3 times a week for therapy or home therapy.


   2.  Continue working on all exercises taught to you in the hospital.  Your


                 goals should be to increase bending of your knee to 90 degrees 

and


                 beyond and to fully straighten your knee.





B.  You may progress at your own pace from walking with a walker or crutches to 

a cane; then to no assistive devices.





C.   Make walking a part of your daily routine.  Be up as much as comfortable 

with rest periods throughout the day.  


      Rest with leg elevation is very important. 


      Use the ice wrap frequently for the first 3-4 weeks.





D.  There are no restrictions on activities.  You may ride in a car, shop, 

participate in household chores and all social activities.





E.  Wear the long elastic stockings (CANDACE hose) 20 hours a day for 2 weeks after 

surgery.  


     They can be removed several times a day for laundering and for a bath.





F.  You may shower, no tub baths until cleared by your doctor.








SPECIAL CARE INSTRUCTIONS:





**VERY IMPORTANT TO READ AND REVIEW**





A.  There are a few signs you need to watch for after you are home.  Call  

[facility] if you notice any of the followin.  Increased severe knee pain.  Some pain is expected especially  when you 

exercise.


   2.  Increased swelling in your leg or knee; pain or swelling of the calf 

muscle in either lower leg.


   3.  Any fluid drainage from the incision.


   4.  Shortness of breath or chest pain.





B.  Please call [facility] at [phone] if you have any  

concerns or questions about your operation or recovery.  


     The doctor or his nurse will return your call promptly.





C.  You must take antibiotics before dental work, bladder, bowel or other 

surgery.  


      Your doctor will provide you with a permanent care to carry describing 

this precaution.





IMPORTANT:





*  REMEMBER TO TAKE ASPIRIN, 81 MG, TWICE DAILY FOR 4 WEEKS UNLESS OTHERWISE 

DIRECTED.  


   THIS IS YOUR BLOOD THINNER.





*  HIGH RISK PATIENTS MAY BE PRESCRIBED A STRONGER BLOOD THINNER.  


   THIS WILL BE PROVIDED AT DISCHARGE.





*  CALL IF INCREASED PAIN, REDNESS, DRAINAGE OR FEVER GREATER THAT 101.





*  WEAR CANDACE HOSE 20 HOURS PER DAY FOR 2 WEEKS.





*  YOU MAY HAVE A LARGE BAND-AID LIKE DRESSING (SILVERON).  THIS WILL  REMAIN 

ON YOUR INCISION FOR 7 DAYS, THEN CAN BE REMOVED. 


    IF INCISION IS LEAKING THROUGH DRESSING, CALL THE OFFICE [phone].








FOLLOW UP VISIT:





If appointment is not already scheduled:





Please call [facility] to make a follow-up appointment for 


2 weeks after your surgery at [phone].





Current Hospital Diet


Patient's current hospital diet: Regular Diet





Discharge Diet


Recommended Diet:  Regular Diet





Procedures


Procedures Performed:  


Left Total Knee Arthroplasty





Pending Studies


Studies pending at discharge:  no





Laboratory Results





Hemoglobin A1c








Test


  17


14:34 Range/Units


 


 


Estimated Average Glucose 117   mg/dl


 


Hemoglobin A1c 5.7 H 4.5-5.6  %











Medical Emergencies








.


Who to Call and When:





Medical Emergencies:  If at any time you feel your situation is an emergency, 

please call 911 immediately.





.





Non-Emergent Contact


Non-Emergency issues call your:  Surgeon


.








"Provider Documentation" section prepared by Jennifer M. Illig.








.





VTE Core Measure


Inpt VTE Proph given/why not?:  Other Anticoagulation, T.E.D. Stockings, SCD's





[place] Drug Monitoring Program


Search Results:  patient reviewed within database, no issues identified

## 2017-10-27 NOTE — ORTHOPEDIC PROGRESS NOTE
Orthopedic Progress Note


Date of Service


Oct 27, 2017.





Subjective


Post OP Day:  2


Reports: feeling well, pain controlled w PO medications, Denies: complaints, 

chest pain, SOB, nausea / vomiting, light headedness, calf pain





Objective


calves soft nontender, N/V intact, capillary refill less than 2 sec., dressing C

/D/I, A&O x3, toes mobile











  Date Time  Temp Pulse Resp B/P (MAP) Pulse Ox O2 Delivery O2 Flow Rate FiO2


 


10/27/17 06:46 36.6 68 16 171/82 (111) 95 Room Air  


 


10/26/17 23:50      Room Air  


 


10/26/17 22:56 36.5 60 16 152/67 (95) 97 Room Air  


 


10/26/17 20:40 35.7 64 20 146/64 (91) 96 Room Air  


 


10/26/17 20:37     96 Room Air  


 


10/26/17 16:10      Room Air  


 


10/26/17 15:24 36.4 58 20 163/85 (111) 96 Room Air  


 


10/26/17 11:46 36.4 66 16 146/83 (104) 98 Room Air  








Laboratory Results 24 Hours:











Test


  10/27/17


05:46


 


Hematocrit 35.9 % 


 


Hemoglobin 12.3 g/dL 











Assessment & Plan


Assessment:


POD #2, Left TKA


Plan:


PT/ OT


DVT proph- ASA


D/C planning- Home w OPPT today.


As per medicine








Inhouse Planning


Pain Management:  Celebrex, Norco, Morphine, PO Tylenol


DVT Prophylaxis:  TEDs, SCDs, ASA





Discharge Planning


Discharge Planning:  home with oppt


Pain Management:  Celebrex, Norco, PO Tylenol


DVT Prophylaxis:  TEDs, ASA


Therapy:  Physical Therapy, Occupational Therapy

## 2017-10-31 NOTE — DISCHARGE SUMMARY
DISCHARGE DIAGNOSIS:  Degenerative joint disease, left knee.

 

SECONDARY DIAGNOSES:  History of congenital heart disease with valvular heart

disease, hypercholesterolemia, irregular heartbeat, sleep apnea with use of

CPAP, osteoarthritis, and obesity.

 

CONSULTS:  AMAN Garrison and Ashanti Olivo DO

 

COMPLICATIONS:  None.

 

PROCEDURES:  Left total knee arthroplasty performed by Dr. Kohler on

10/25/2017.

 

BRIEF HISTORY:  As dictated in the history and physical.

 

HOSPITAL SUMMARY:  The patient was admitted on the above-noted date and had

the above-noted surgery performed, which the patient tolerated well.  On the

first postoperative day, he was feeling well.  Pain was controlled.  Calves

were soft, nontender, neurovascularly intact.  Dressings were clean, dry and

intact.  Toes were mobile.  Hemoglobin was 13.0.  Vital signs were stable. 

He was afebrile and he was started on physical therapy protocol and continued

on DVT prophylaxis and pain management.  By his second postoperative day, he

was feeling well and pain was controlled.  Calves were soft, nontender,

neurovascularly intact and dressings were clean, dry and intact.  Toes were

mobile.  Hemoglobin was 12.3.  He was progressing with his physical therapy

and remaining medically stable and it was felt he could be discharged to home

on 10/27/2017.  For further review, please see chart.

 

LAB AND X-RAY DATA:  As per chart.

 

DISCHARGE INSTRUCTIONS:  The patient was discharged to home in satisfactory

condition on 10/27/2017.

 

DIET:  Regular.

 

ACTIVITY:  Follow TK instruction sheets and special care instructions as

noted.  Follow up with Dr. Kohler in 2 weeks.  The patient is to call for an

appointment if one has not been made for you.

 

DISCHARGE MEDICATIONS:  Celebrex 200 mg p.o. b.i.d., Norco 5/325 one to two

tabs p.o. q. 4 hours p.r.n., Zofran 8 mg p.o. q. 8 hours p.r.n. nausea, and

senna 17.2 mg p.o. at bedtime.  Resume home meds as listed.

## 2018-01-02 LAB
ALBUMIN SERPL-MCNC: 4.1 GM/DL (ref 3.4–5)
BASOPHILS # BLD: 0.04 K/UL (ref 0–0.2)
BASOPHILS NFR BLD: 0.6 %
BUN SERPL-MCNC: 13 MG/DL (ref 7–18)
CALCIUM SERPL-MCNC: 9.1 MG/DL (ref 8.5–10.1)
CO2 SERPL-SCNC: 26 MMOL/L (ref 21–32)
CREAT SERPL-MCNC: 0.78 MG/DL (ref 0.6–1.4)
EOS ABS #: 0.12 K/UL (ref 0–0.5)
EOSINOPHIL NFR BLD AUTO: 215 K/UL (ref 130–400)
GLUCOSE SERPL-MCNC: 79 MG/DL (ref 70–99)
HCT VFR BLD CALC: 42.9 % (ref 42–52)
HGB BLD-MCNC: 14.6 G/DL (ref 14–18)
IG#: 0.01 K/UL (ref 0–0.02)
IMM GRANULOCYTES NFR BLD AUTO: 26.4 %
INR PPP: 1 (ref 0.9–1.1)
LYMPHOCYTES # BLD: 1.81 K/UL (ref 1.2–3.4)
MCH RBC QN AUTO: 29.9 PG (ref 25–34)
MCHC RBC AUTO-ENTMCNC: 34 G/DL (ref 32–36)
MCV RBC AUTO: 87.9 FL (ref 80–100)
MONO ABS #: 0.61 K/UL (ref 0.11–0.59)
MONOCYTES NFR BLD: 8.9 %
NEUT ABS #: 4.26 K/UL (ref 1.4–6.5)
NEUTROPHILS # BLD AUTO: 1.8 %
NEUTROPHILS NFR BLD AUTO: 62.2 %
PMV BLD AUTO: 8.9 FL (ref 7.4–10.4)
POTASSIUM SERPL-SCNC: 3.8 MMOL/L (ref 3.5–5.1)
PTT PATIENT: 27.5 SECONDS (ref 21–31)
RED CELL DISTRIBUTION WIDTH CV: 13.8 % (ref 11.5–14.5)
RED CELL DISTRIBUTION WIDTH SD: 43.9 FL (ref 36.4–46.3)
SODIUM SERPL-SCNC: 135 MMOL/L (ref 136–145)
WBC # BLD AUTO: 6.85 K/UL (ref 4.8–10.8)

## 2018-01-03 LAB — HBA1C MFR BLD: 5.5 % (ref 4.5–5.6)

## 2018-01-30 NOTE — HISTORY & PHYSICAL EXAMINATION
DATE OF ADMISSION:  01/31/2018

 

CHIEF COMPLAINT:  Chronic right knee pain.

 

HISTORY OF PRESENT ILLNESS:  This is a 57-year-old male patient of Dr. Kohler's complaining of chronic right knee pain, longstanding, now

progressively getting worse.  The patient has failed conservative treatment

including anti-inflammatories, intra-articular injections and the use of a

cane.  The patient has been diagnosed with end-stage osteoarthritis in his

right knee, per clinical and radiographic exams.  The patient has increased

pain with weightbearing activities and his pain does interfere with his

activities of daily living.

 

PAST MEDICAL HISTORY:  Heart valve problem, sleep apnea with the use of CPAP,

obesity.

 

SOCIAL HISTORY:  Nonsmoker, occasional drinker.

 

PAST SURGICAL HISTORY:  Knee surgery, back surgery.

 

FAMILY HISTORY:  Noncontributory.

 

REVIEW OF SYSTEMS:  The patient complains of chronic right knee pain,

otherwise denies any shortness of breath, chest pain, nausea, vomiting or any

other joint complaints.

 

FAMILY HISTORY:  Noncontributory.

 

MEDICATIONS:  Simvastatin 20 mg daily, Cialis 5 mg as needed, multivitamin

daily, fish oil daily, aspirin 81 mg daily.

 

ALLERGIES:  INCLUDE OXYCODONE.

 

PHYSICAL EXAMINATION:

GENERAL:  Well-developed, well-nourished 57-year-old male patient in no acute

distress.  He is alert and oriented x3 and pleasant.

HEENT:  Normocephalic, atraumatic.  Extraocular motions are intact.  Pupils

are equal and reactive to light.

HEART:  Regular rate and rhythm, no murmurs appreciated.

LUNGS:  Clear.

ABDOMEN:  Soft, nontender, bowel sounds present.

EXTREMITIES:  Right knee reveals a varus deformity.  He has a limited range

of motion of 0-125 degrees.  He has a mild effusion.  He has medial joint

line tenderness with crepitation.  He has 5/5 strength.  Neurologically and

neurovascularly he is intact in his right lower extremity.

 

DIAGNOSES:  Right knee end-stage osteoarthritis with a history of heart valve

problem, sleep apnea with the use of CPAP, osteoarthritis, obesity.

 

PLAN:  The patient was advised of his diagnoses.  Indications, risks,

benefits, and postop course have all been reviewed.  The patient wishes to

proceed with a right total knee arthroplasty.  Necessary consent forms,

preoperative testing and clearances will be obtained.

## 2018-01-31 ENCOUNTER — HOSPITAL ENCOUNTER (INPATIENT)
Dept: HOSPITAL 45 - C.ACU | Age: 58
LOS: 2 days | Discharge: HOME | DRG: 470 | End: 2018-02-02
Attending: ORTHOPAEDIC SURGERY | Admitting: ORTHOPAEDIC SURGERY
Payer: COMMERCIAL

## 2018-01-31 VITALS
TEMPERATURE: 97.7 F | DIASTOLIC BLOOD PRESSURE: 82 MMHG | SYSTOLIC BLOOD PRESSURE: 158 MMHG | OXYGEN SATURATION: 94 % | HEART RATE: 87 BPM

## 2018-01-31 VITALS
DIASTOLIC BLOOD PRESSURE: 76 MMHG | OXYGEN SATURATION: 94 % | HEART RATE: 99 BPM | SYSTOLIC BLOOD PRESSURE: 145 MMHG | TEMPERATURE: 97.88 F

## 2018-01-31 VITALS — HEART RATE: 86 BPM | OXYGEN SATURATION: 93 % | DIASTOLIC BLOOD PRESSURE: 80 MMHG | SYSTOLIC BLOOD PRESSURE: 133 MMHG

## 2018-01-31 VITALS
TEMPERATURE: 97.52 F | DIASTOLIC BLOOD PRESSURE: 74 MMHG | SYSTOLIC BLOOD PRESSURE: 131 MMHG | OXYGEN SATURATION: 95 % | HEART RATE: 70 BPM

## 2018-01-31 VITALS
HEART RATE: 73 BPM | TEMPERATURE: 97.7 F | DIASTOLIC BLOOD PRESSURE: 84 MMHG | SYSTOLIC BLOOD PRESSURE: 161 MMHG | OXYGEN SATURATION: 95 %

## 2018-01-31 VITALS — OXYGEN SATURATION: 96 % | DIASTOLIC BLOOD PRESSURE: 79 MMHG | HEART RATE: 66 BPM | SYSTOLIC BLOOD PRESSURE: 130 MMHG

## 2018-01-31 VITALS
TEMPERATURE: 98.24 F | SYSTOLIC BLOOD PRESSURE: 132 MMHG | OXYGEN SATURATION: 95 % | DIASTOLIC BLOOD PRESSURE: 71 MMHG | HEART RATE: 70 BPM

## 2018-01-31 VITALS — DIASTOLIC BLOOD PRESSURE: 72 MMHG | HEART RATE: 69 BPM | SYSTOLIC BLOOD PRESSURE: 133 MMHG | OXYGEN SATURATION: 94 %

## 2018-01-31 VITALS — OXYGEN SATURATION: 94 % | HEART RATE: 62 BPM | SYSTOLIC BLOOD PRESSURE: 144 MMHG | DIASTOLIC BLOOD PRESSURE: 90 MMHG

## 2018-01-31 VITALS
BODY MASS INDEX: 39.46 KG/M2 | BODY MASS INDEX: 39.46 KG/M2 | HEIGHT: 66 IN | HEIGHT: 66 IN | WEIGHT: 245.51 LBS | WEIGHT: 245.51 LBS

## 2018-01-31 DIAGNOSIS — N40.0: ICD-10-CM

## 2018-01-31 DIAGNOSIS — E78.5: ICD-10-CM

## 2018-01-31 DIAGNOSIS — Z79.899: ICD-10-CM

## 2018-01-31 DIAGNOSIS — I35.1: ICD-10-CM

## 2018-01-31 DIAGNOSIS — G47.33: ICD-10-CM

## 2018-01-31 DIAGNOSIS — E66.9: ICD-10-CM

## 2018-01-31 DIAGNOSIS — M17.11: Primary | ICD-10-CM

## 2018-01-31 PROCEDURE — 0SRC0J9 REPLACEMENT OF RIGHT KNEE JOINT WITH SYNTHETIC SUBSTITUTE, CEMENTED, OPEN APPROACH: ICD-10-PCS | Performed by: ORTHOPAEDIC SURGERY

## 2018-01-31 RX ADMIN — SIMVASTATIN SCH MG: 20 TABLET, FILM COATED ORAL at 20:47

## 2018-01-31 RX ADMIN — CEFAZOLIN SCH MLS/MIN: 10 INJECTION, POWDER, FOR SOLUTION INTRAVENOUS at 23:25

## 2018-01-31 RX ADMIN — HYDROCODONE BITARTRATE AND ACETAMINOPHEN PRN TAB: 5; 325 TABLET ORAL at 18:56

## 2018-01-31 RX ADMIN — Medication SCH MG: at 20:48

## 2018-01-31 RX ADMIN — DEXTROSE MONOHYDRATE, SODIUM CHLORIDE, AND POTASSIUM CHLORIDE SCH MLS/HR: 50; 4.5; 1.49 INJECTION, SOLUTION INTRAVENOUS at 21:52

## 2018-01-31 RX ADMIN — ZOLPIDEM TARTRATE PRN MG: 5 TABLET, FILM COATED ORAL at 23:29

## 2018-01-31 RX ADMIN — DEXTROSE MONOHYDRATE, SODIUM CHLORIDE, AND POTASSIUM CHLORIDE SCH MLS/HR: 50; 4.5; 1.49 INJECTION, SOLUTION INTRAVENOUS at 12:03

## 2018-01-31 RX ADMIN — CEFAZOLIN SCH MLS/MIN: 10 INJECTION, POWDER, FOR SOLUTION INTRAVENOUS at 16:26

## 2018-01-31 RX ADMIN — CELECOXIB SCH MG: 200 CAPSULE ORAL at 20:49

## 2018-01-31 RX ADMIN — DOCUSATE SODIUM SCH MG: 100 CAPSULE, LIQUID FILLED ORAL at 20:48

## 2018-01-31 NOTE — DIAGNOSTIC IMAGING REPORT
RIGHT KNEE 2 VIEWS



CLINICAL HISTORY: Degenerative arthritis. Postoperative study.     



COMPARISON: None.



DISCUSSION: There are postsurgical changes of a total right knee arthroplasty

and patellar resurfacing. The femoral and tibial components appear well seated.

Overlying skin staples and surgical drains are evident. There is air within the

soft tissues consistent with recent surgery.    



IMPRESSION: Postsurgical changes of a total right knee arthroplasty.







Electronically signed by:  Hemanth Starkey M.D.

1/31/2018 10:14 AM



Dictated Date/Time:  1/31/2018 10:13 AM

## 2018-01-31 NOTE — MEDICAL CONSULT
Consultation


Date of Consultation:


Jan 31, 2018.


Attending Physician:


Clif Kohler M.D.


Reason for Consultation:


post-op medical management


History of Present Illness


Pt is 58 y/o M with PMH hyperlipidemia, [place] on cpap, BPH, mild-moderate aortic 

regurgitation s/p R TKA by Dr Kohler today. Is doing well post op. Denies any 

pain, states still some tingling sensation to LE. Hasn't urinated or had BM 

after surgery yet. Denies nausea or vomiting. Is drinking fluids well. Denies 

fever/chills, diaphoresis, HA, dizziness, vision changes, neck pain, CP, SOB, 

palpitations, cough, sore throat, choking, abdominal pain, rashes.





Past Medical/Surgical History


Medical Problems:


(1) Aortic regurgitation


Status: Chronic  





(2) HLD (hyperlipidemia)


Status: Chronic  





(3) [place] on CPAP


Status: Chronic  





Surgical Problems:


(1) H/O arthroscopic knee surgery


Status: Chronic  





(2) H/O laminectomy


Status: Chronic  





(3) H/O vasectomy


Status: Chronic  





(4) History of hemorrhoidectomy


Status: Chronic  





(5) History of left knee replacement


Status: Resolved  





(6) S/P rotator cuff repair


Status: Chronic  








Family History





Diabetes mellitus


  FATHER


FH: prostate cancer


  FATHER





Social History


Smoking Status:  Never Smoker


Smokeless Tobacco Use:  No


Alcohol Use:  socially (2 beers, 1 mixed drink twice a week)


Drug Use:  none


Marital Status:  


Housing Status:  lives with significant other


Occupation Status:  employed





Allergies


Coded Allergies:  


     Oxycodone (Verified  Adverse Reaction, Mild, "BUGS CRAWLING", 1/31/18)





Current Inpatient Medications





Current Inpatient Medications








 Medications


  (Trade)  Dose


 Ordered  Sig/Vaishnavi


 Route  Start Time


 Stop Time Status Last Admin


Dose Admin


 


 Lactated Ringer's  1,000 ml @ 


 60 mls/hr  [id number]


 IV  1/31/18 06:00


 1/31/18 22:39   


 


 


 Lactated Ringer's  1,000 ml @ 


 15 mls/hr  Q24H


 IV  1/31/18 06:00


 2/1/18 05:59  1/31/18 06:21


15 MLS/HR


 


 Cefazolin Sodium  10 ml @ 


 2.5 mls/min  PREOP


 IV  1/31/18 06:00


 1/31/18 18:00  1/31/18 07:31


2.5 MLS/MIN


 


 Acetaminophen


  (Tylenol Tab)  1,000 mg  PREOP


 PO  1/31/18 06:00


 1/31/18 18:00  1/31/18 06:24


1,000 MG


 


 Celecoxib


  (CeleBREX CAP)  200 mg  PREOP


 PO  1/31/18 06:00


 1/31/18 18:00  1/31/18 06:23


200 MG


 


 Dexamethasone


  (Decadron Tab)  8 mg  PREOP


 PO  1/31/18 06:00


 1/31/18 18:00  1/31/18 06:23


8 MG


 


 Famotidine


  (Pepcid Tab)  20 mg  PREOP


 PO  1/31/18 06:00


 1/31/18 18:00  1/31/18 06:23


20 MG


 


 Gabapentin


  (Neurontin Cap)  600 mg  PREOP


 PO  1/31/18 06:00


 1/31/18 18:00  1/31/18 06:23


600 MG


 


 Metoclopramide HCl


  (Reglan Tab)  10 mg  PREOP


 PO  1/31/18 06:00


 1/31/18 18:00  1/31/18 06:24


10 MG


 


 Tranexamic Acid


 1000 mg/Syringe  10 ml @ 1


 mls/min  TODAY@06,0630


 IV  1/31/18 06:00


 1/31/18 14:00  1/31/18 06:33


1 MLS/MIN


 


 Fentanyl Citrate


  (Fentanyl Inj)  25 mcg  Q5M  PRN


 IV  1/31/18 07:15


 1/31/18 12:15   


 


 


 Ondansetron HCl


  (Zofran Inj)  4 mg  ONE  PRN


 IV  1/31/18 07:15


 1/31/18 12:15   


 


 


 Ephedrine Sulfate


  (EpHEDrine


 SULFATE INJ)  5 mg  Q5M  PRN


 IV  1/31/18 07:15


 1/31/18 12:15   


 


 


 Atropine Sulfate


  (Atropine


 Sulfate 0.1mg/ml


 Inj)  0.5 mg  Q1M  PRN


 IV  1/31/18 07:15


 1/31/18 12:15   


 


 


 Simvastatin


  (Zocor Tab)  20 mg  QPM


 PO  1/31/18 21:00


 3/2/18 20:59 UNV  


 


 


 Non-Formulary


 Medication


  (Tadalafil


  (Cialis))  1 tab  QAM


 PO  2/1/18 09:00


 3/3/18 08:59 UNV  


 


 


 Potassium


 Chloride/Dextrose/


 Sod Cl  1,000 ml @ 


 100 mls/hr  Q10H


 IV  1/31/18 09:41


 2/1/18 09:40 UNV  


 


 


 Cefazolin Sodium


 2000 mg/Dextrose  60 ml @ 


 100 mls/hr  Q8H


 IV  1/31/18 09:45


 1/31/18 18:20 UNV  


 


 


 Celecoxib


  (CeleBREX CAP)  200 mg  BID


 PO  1/31/18 21:00


 3/2/18 20:59 UNV  


 


 


 Acetaminophen/


 Hydrocodone Bitart


  (Norco 5/325 Tab)  1 TABLET


 FOR PAIN


 RATING...  Q4H  PRN


 PO  1/31/18 09:45


 2/14/18 09:44 UNV  


 


 


 Morphine Sulfate


  (MoRPHine


 SULFATE INJ)  as needed  Q2H  PRN


 IV  1/31/18 09:45


 2/14/18 09:44 UNV  


 


 


 Acetaminophen


  (Tylenol Tab)  650 mg  Q6H  PRN


 PO  1/31/18 09:45


 3/2/18 09:44 UNV  


 


 


 Magnesium


 Hydroxide


  (Milk Of


 Magnesia Susp)  30 ml  Q6H  PRN


 PO  1/31/18 09:45


 3/2/18 09:44 UNV  


 


 


 Bisacodyl


  (Dulcolax Supp)  10 mg  DAILY  PRN


 [place]  1/31/18 09:45


 3/2/18 09:44 UNV  


 


 


 Sodium


 Biphosphate/


 Sodium Phosphate


  (Fleet Enema)  132 ml  DAILY  PRN


 [place]  1/31/18 09:45


 3/2/18 09:44 UNV  


 


 


 Docusate Sodium


  (coLACE CAP)  100 mg  BID


 PO  1/31/18 21:00


 3/2/18 20:59 UNV  


 


 


 Diphenhydramine


 HCl


  (Benadryl Cap)  25 mg  Q8H  PRN


 PO  1/31/18 09:45


 3/2/18 09:44 UNV  


 


 


 Zolpidem Tartrate


  (Ambien Tab)  5 mg  HSZ  PRN


 PO  1/31/18 09:45


 3/2/18 09:44 UNV  


 


 


 Multivitamins


  (Multivitamin


 Tab)  1 tab  QAM


 PO  2/1/18 09:00


 3/3/18 08:59 UNV  


 


 


 Ondansetron HCl


  (Zofran Inj)  4 mg  Q6H  PRN


 IV  1/31/18 09:45


 3/2/18 09:44 UNV  


 


 


 Metoclopramide HCl


  (Reglan Inj)  10 mg  Q6H  PRN


 IV  1/31/18 09:45


 3/2/18 09:44 UNV  


 


 


 Pantoprazole


 Sodium


  (Protonix Tab)  40 mg  QAM


 PO  2/1/18 09:00


 3/3/18 08:59 UNV  


 


 


 Aspirin


  (Ecotrin Tab)  81 mg  BID


 PO  1/31/18 21:00


 3/2/18 20:59 UNV  


 











Physical Exam











  Date Time  Temp Pulse Resp B/P (MAP) Pulse Ox O2 Delivery O2 Flow Rate FiO2


 


1/31/18 11:08  66 16 130/79 (96) 96  2.0 


 


1/31/18 10:40 36.8 70 16 132/71 (91) 95 Nasal Cannula 2.0 


 


1/31/18 10:40     95 Nasal Cannula 2.0 


 


1/31/18 10:40      Nasal Cannula 2.0 


 


1/31/18 10:20 36.8 72 15 126/72 95 Nasal Cannula 2 


 


1/31/18 10:10  72 16 126/70 94 Nasal Cannula 2 


 


1/31/18 10:00  74 13 131/70 95 Nasal Cannula 2 


 


1/31/18 09:50  76 14 126/67 98 Oxymask 10 


 


1/31/18 09:40 37.3 76 16 113/65 96 Oxymask 10 


 


1/31/18 05:51 36.5 73 20 161/84 95 Room Air  








General Appearance:  WD/WN, no apparent distress


Head:  normocephalic, atraumatic


Eyes:  normal inspection, PERRL, EOMI, sclerae normal


ENT:  hearing grossly normal, pharynx normal, + pertinent finding (mucous 

membranes moist)


Neck:  supple, trachea midline


Respiratory/Chest:  chest non-tender, lungs clear, normal breath sounds, no 

respiratory distress, no accessory muscle use


Cardiovascular:  regular rate, rhythm, normal peripheral pulses


Abdomen/GI:  normal bowel sounds, non tender, soft


Extremities/Musculoskelatal:  normal capillary refill, + pertinent finding (RLE

: right knee with surgical dressing in place. sensation to light touch intact, 

distal pulses intact bilateral extremities)


Neurologic/Psych:  alert, normal mood/affect, oriented x 3


Skin:  normal color, warm/dry





Assessment & Plan


Pt post op day#0  S/P R TKA by Dr Kohler


-pain management per ortho


-wound management per ortho


-PT/OT as appropriate 


-DVT prophylaxis per ortho


-incentive spirometry


-monitor H&H for acute blood loss anemia





[place]


Pt uses CPAP at home. He is denying use in hospital currently. Discussed with 

pt O2 HS may be needed, he expresses understanding





HYPERLIPIDEMIA


Lipid panel 11/17: total: 152, LDL: 72, HDL: 69, Triglycerides: 54


-continue simvastatin 





DVT PROPHYLAXIS


-ASA 81mg BID per ortho





DISPOSITION


-admitted med surg


-Full code


-Follows with Dr Lugo for routine care





Pt was seen with Dr Olivo. See addendum





Pt will be followed by Dr Garcia for hospital course.





ADDENDUM:





This is a 57 year old male with a PMH of obesity, hyperlipidemia, [place] on 

nocturnal CPAP - presents for a scheduled R knee surgery.


Doing well, no pain currently, no nausea/vomiting.


No other symptoms to note at this time.





Plan:


monitor labs, H/H, electrolytes


continue Zocor


can hold fish oils until discharge


aspirin 81mg bid for DVT ppx


does not want to use CPAP while here, can use O2 nocturnally





Additional Copies To


Graciela Lugo D.O.

## 2018-01-31 NOTE — ANESTHESIOLOGY PROGRESS NOTE
Anesthesia Post Op Note


Date & Time


Jan 31, 2018 at 10:16





Vital Signs


Pain Intensity:  0





Vital Signs Past 12 Hours








  Date Time  Temp Pulse Resp B/P (MAP) Pulse Ox O2 Delivery O2 Flow Rate FiO2


 


1/31/18 10:10  72 16 126/70 94 Nasal Cannula 2 


 


1/31/18 10:00  74 13 131/70 95 Nasal Cannula 2 


 


1/31/18 09:50  76 14 126/67 98 Oxymask 10 


 


1/31/18 09:40 37.3 76 16 113/65 96 Oxymask 10 


 


1/31/18 05:51 36.5 73 20 161/84 95 Room Air  











Notes


Mental Status:  alert / awake / arousable, participated in evaluation


Pt Amnestic to Procedure:  Yes


Nausea / Vomiting:  adequately controlled


Pain:  adequately controlled


Airway Patency, RR, SpO2:  stable & adequate


BP & HR:  stable & adequate


Hydration State:  stable & adequate


Neuraxial Anesthesia:  was administered, sensory block is resolving


Anesthetic Complications:  no major complications apparent

## 2018-01-31 NOTE — [facility] POST OPERATIVE BRIEF NOTE
Immediate Operative Summary


Operative Date


Jan 31, 2018.





Pre-Operative Diagnosis





Right Knee End-Stage Osteoarthritis





Post-Operative Diagnosis





Right Knee End-Stage Osteoarthritis





Procedure(s) Performed





Right Total Knee Arthroplasty, Cemented





Surgeon


Dr. Kohler





Assistant Surgeon(s)


Manuel Braun PA-C





Estimated Blood Loss


5 mL





Findings


Consistent with Post-Op Diagnosis





Specimens





A: Right Knee Bone and Tissue





Drains


2 hemovac





Anesthesia Type


MAC Spinal Regional





Complication(s)


none





Disposition


Disposition:  Recovery Room / PACU

## 2018-01-31 NOTE — OPERATIVE REPORT
DATE OF OPERATION:  01/31/2018

 

INDICATION FOR PROCEDURE:  The patient is a 57-year-old male who presents

with chronic progressive right knee osteoarthritis.  He had bilateral knee

osteoarthritis, previous successful left knee replacement in October.  He is

doing well with that.  He now presents for a staged replacement of his right

knee.  Radiographs demonstrate he has a varus knee, bone-on-bone in the

medial compartment.

 

PREOPERATIVE DIAGNOSIS:  End-stage osteoarthritis of the right knee.

 

POSTOPERATIVE DIAGNOSIS:  Same.

 

PROCEDURE:  Right total knee arthroplasty.

 

SURGEON:  Dr. Clif Kohler.

 

ASSISTANT:  GENE Horne.

 

ANESTHESIA:  Spinal adductor nerve block and Orthomix sedation.

 

OPERATIVE PROCEDURE:  The patient was taken to the operating room,

anesthetized under anesthesia as dictated.  Pneumatic tourniquet was placed

about his obese right upper thigh.  His right knee was examined.  He had

about a 10-degree flexion contracture and flexion to 125 degrees.  He had a

stiff knee.  He had no instability.  He had a moderate effusion.  His right

lower extremity was prepped and draped with ChloraPrep.  The leg was

elevated, exsanguinated with Esmarch bandage.  Pneumatic tourniquet was

raised to 350 mmHg because of his obesity.  An anterior incision was made

across his right knee in a longitudinal fashion.  Skin was incised sharply. 

Subcutaneous flaps were elevated.  He had very thickened scarred prepatellar

bursa noted.  We excised the scarred prepatellar bursa and bands of scarred

bursa coursing across the patellar tendon.  The incision was made then

through the medial retinaculum and extended up into the mid third of the

quadriceps tendon and then down to the medial tibial tubercle. 

Intraarticular findings demonstrated that he had tricompartmental osteophytes

with primarily medial compartment OA, bone-on-bone in the medial compartment.

 He did have some synovitis.  Some thickened synovial tissue was resected in

the suprapatellar pouch and the medial side of his knee.  The meniscal

remnants were resected.  The infrapatellar fat pad was resected and some of

the fat pad over the anterior femur for placement of the component in that

area was resected and the lateral synovial bands were released.  The cruciate

ligaments were excised and the menisci were excised.  We had a tight medial

compartment and we had to do medial and posteromedial releases to balance the

ligaments and we had to also pie crust the anterior band of the MCL.  The

[employer] Journey 2.0 total knee arthroplasty system was used using

[facility] MRI templating.  The femur was exposed and the custom femoral

cutting block was pinned in position and a distal femoral cut was made. 

Because of the tight knee, we had to address the patella first so the knee

was extended and a subperiosteal peel lateral release was performed around

the patella.  Patella width was measured and width was reproduced using a

freehand cut technique and a 35-mm dome patellar component.  Drill holes were

made and excess lateral facet was beveled off to prevent any impingement and

all bone spurs were removed about the patella.  Then retractors were placed

to better expose the femur.  A size 6, 5-in-1 cutting block for the Journey

2.0 knee was placed.  The anterior, posterior and chamfer cuts were made. 

Then the tibia was subluxed and the custom tibial cutting block was pinned in

position and the proximal tibial cut was made.  The ligaments were balanced

in extension and flexion.  The tibia was downsized to 5 as we could get

better external rotation with the alignment of the tibial tubercle with this

trial, which was pinned in position and then the punch for the stem was used

and then the 6 femoral trial was inserted, centered, and the notch cutting

devices were used and the collet was placed and a 12 poly insert gave

balanced ligaments through full range of motion.  The patella tracked

centrally.  The trials were removed.  The anesthetic cocktail was injected

per protocol.  Then the knee was copiously irrigated with pulsatile lavage

antibiotic solution and bacitracin.  The final components were cemented with

Simplex G cement.  While the cement was curing, we used Betadine soak per

protocol.  Then this was irrigated out with antibiotic solution and

bacitracin.  Two drains were brought out laterally connected to Hemovac.  The

quadriceps tendon and medial retinaculum were closed with interrupted

figure-of-eight #1 Vicryl sutures.  The knee was taken through a full range

of motion and the repair was secure.  The patient had full extension and

flexion to 130 degrees without difficulty and a stable knee through full

motion.  The subcutaneous tissues were injected with more of the Orthomix

anesthetic and then the subcutaneous tissue closed with interrupted 2-0

Vicryl, skin was closed with gabriela.  Silverlon sterile dressing was placed

and tourniquet was let down.  The patient had good capillary refill to the

extremity and tolerated the procedure well.  GENE Horne, was my first

assistant.  He functioned as first assistant through the entire procedure. 

He assisted in patient positioning, prepping, draping, leg positioning, soft

tissue retraction, instrument management and assisted in the subcutaneous

skin closure and would participate in postoperative care of the patient.

 

 

I attest to the content of the Intraoperative Record and any orders documented therein. Any exception
s are noted below.

## 2018-02-01 VITALS
HEART RATE: 67 BPM | SYSTOLIC BLOOD PRESSURE: 140 MMHG | DIASTOLIC BLOOD PRESSURE: 74 MMHG | TEMPERATURE: 97.34 F | OXYGEN SATURATION: 96 %

## 2018-02-01 VITALS — SYSTOLIC BLOOD PRESSURE: 155 MMHG | OXYGEN SATURATION: 99 % | DIASTOLIC BLOOD PRESSURE: 79 MMHG | HEART RATE: 75 BPM

## 2018-02-01 VITALS
DIASTOLIC BLOOD PRESSURE: 70 MMHG | SYSTOLIC BLOOD PRESSURE: 122 MMHG | HEART RATE: 67 BPM | OXYGEN SATURATION: 98 % | TEMPERATURE: 98.06 F

## 2018-02-01 VITALS
OXYGEN SATURATION: 97 % | TEMPERATURE: 97.88 F | DIASTOLIC BLOOD PRESSURE: 79 MMHG | HEART RATE: 70 BPM | SYSTOLIC BLOOD PRESSURE: 139 MMHG

## 2018-02-01 VITALS
OXYGEN SATURATION: 96 % | DIASTOLIC BLOOD PRESSURE: 75 MMHG | SYSTOLIC BLOOD PRESSURE: 126 MMHG | TEMPERATURE: 97.52 F | HEART RATE: 69 BPM

## 2018-02-01 LAB
BUN SERPL-MCNC: 9 MG/DL (ref 7–18)
CALCIUM SERPL-MCNC: 8 MG/DL (ref 8.5–10.1)
CO2 SERPL-SCNC: 25 MMOL/L (ref 21–32)
CREAT SERPL-MCNC: 0.81 MG/DL (ref 0.6–1.4)
EOSINOPHIL NFR BLD AUTO: 192 K/UL (ref 130–400)
GLUCOSE SERPL-MCNC: 164 MG/DL (ref 70–99)
HCT VFR BLD CALC: 36.1 % (ref 42–52)
HGB BLD-MCNC: 12.3 G/DL (ref 14–18)
MCH RBC QN AUTO: 29.7 PG (ref 25–34)
MCHC RBC AUTO-ENTMCNC: 34.1 G/DL (ref 32–36)
MCV RBC AUTO: 87.2 FL (ref 80–100)
PMV BLD AUTO: 9.1 FL (ref 7.4–10.4)
POTASSIUM SERPL-SCNC: 4.3 MMOL/L (ref 3.5–5.1)
RED CELL DISTRIBUTION WIDTH CV: 14.1 % (ref 11.5–14.5)
RED CELL DISTRIBUTION WIDTH SD: 44.4 FL (ref 36.4–46.3)
SODIUM SERPL-SCNC: 136 MMOL/L (ref 136–145)
WBC # BLD AUTO: 14.44 K/UL (ref 4.8–10.8)

## 2018-02-01 RX ADMIN — PANTOPRAZOLE SCH MG: 40 TABLET, DELAYED RELEASE ORAL at 09:18

## 2018-02-01 RX ADMIN — DOCUSATE SODIUM SCH MG: 100 CAPSULE, LIQUID FILLED ORAL at 09:17

## 2018-02-01 RX ADMIN — CELECOXIB SCH MG: 200 CAPSULE ORAL at 20:32

## 2018-02-01 RX ADMIN — SIMVASTATIN SCH MG: 20 TABLET, FILM COATED ORAL at 20:32

## 2018-02-01 RX ADMIN — Medication SCH TAB: at 09:18

## 2018-02-01 RX ADMIN — DOCUSATE SODIUM SCH MG: 100 CAPSULE, LIQUID FILLED ORAL at 20:32

## 2018-02-01 RX ADMIN — HYDROCODONE BITARTRATE AND ACETAMINOPHEN PRN TAB: 5; 325 TABLET ORAL at 13:12

## 2018-02-01 RX ADMIN — CELECOXIB SCH MG: 200 CAPSULE ORAL at 09:17

## 2018-02-01 RX ADMIN — Medication SCH MG: at 20:32

## 2018-02-01 RX ADMIN — HYDROCODONE BITARTRATE AND ACETAMINOPHEN PRN TAB: 5; 325 TABLET ORAL at 22:29

## 2018-02-01 RX ADMIN — Medication SCH MG: at 09:18

## 2018-02-01 RX ADMIN — ZOLPIDEM TARTRATE PRN MG: 5 TABLET, FILM COATED ORAL at 22:29

## 2018-02-01 RX ADMIN — HYDROCODONE BITARTRATE AND ACETAMINOPHEN PRN TAB: 5; 325 TABLET ORAL at 18:25

## 2018-02-01 NOTE — ORTHOPEDIC PROGRESS NOTE
Orthopedic Progress Note


Date of Service


Feb 1, 2018.





Subjective


Post OP Day:  1


Reports: feeling well, pain controlled w PO medications, Denies: complaints, 

chest pain, SOB, nausea / vomiting, light headedness, calf pain





Objective


calves soft nontender, N/V intact, capillary refill less than 2 sec., dressing C

/D/I, A&O x3, toes mobile











  Date Time  Temp Pulse Resp B/P (MAP) Pulse Ox O2 Delivery O2 Flow Rate FiO2


 


2/1/18 03:56 36.4 69 16 126/75 (92) 96 Room Air  


 


1/31/18 23:30      Room Air  


 


1/31/18 22:56 36.4 70 18 131/74 (93) 95 Room Air  


 


1/31/18 18:38 36.6 99 18 145/76 (99) 94 Room Air  


 


1/31/18 15:16 36.5 87 18 158/82 (107) 94 Room Air  


 


1/31/18 15:10      Room Air  


 


1/31/18 13:49  86 18 133/80 (97) 93 Room Air  


 


1/31/18 12:40  69 18 133/72 (92) 94 Room Air  


 


1/31/18 11:40  62 16 144/90 (108) 94  2.0 


 


1/31/18 11:08  66 16 130/79 (96) 96  2.0 


 


1/31/18 10:40 36.8 70 16 132/71 (91) 95 Nasal Cannula 2.0 


 


1/31/18 10:40     95 Nasal Cannula 2.0 


 


1/31/18 10:40      Nasal Cannula 2.0 


 


1/31/18 10:20 36.8 72 15 126/72 95 Nasal Cannula 2 


 


1/31/18 10:10  72 16 126/70 94 Nasal Cannula 2 


 


1/31/18 10:00  74 13 131/70 95 Nasal Cannula 2 


 


1/31/18 09:50  76 14 126/67 98 Oxymask 10 


 


1/31/18 09:40 37.3 76 16 113/65 96 Oxymask 10 








Laboratory Results 24 Hours:











Test


  2/1/18


05:10


 


Hematocrit 36.1 % 


 


Hemoglobin 12.3 g/dL 











Assessment & Plan


Assessment:


POD #1, Right TKA


Plan:


PT/ OT


DVT proph- ASA


D/C planning- Home w OPPT


As per medicine








Inhouse Planning


Pain Management:  Celebrex, Norco, Morphine, PO Tylenol


DVT Prophylaxis:  TEDs, SCDs, ASA





Discharge Planning


Discharge Planning:  home with oppt


Pain Management:  Norco, PO Tylenol


DVT Prophylaxis:  TEDs, ASA


Therapy:  Physical Therapy, Occupational Therapy

## 2018-02-01 NOTE — PROGRESS NOTE
Internal Med Progress Note


Date of Service:


Feb 1, 2018.


Provider Documentation:





SUBJECTIVE:





Seen and examined at bedside


Right leg pain is controlled


Denies any CP, SOB, dizziness, nausea


Had BM today


No other complaints





OBJECTIVE:





Vital Signs-as noted below





Physical Exam:


General Appearance:Moderately built and nourished, no apparent distress


Head:  normocephalic, Atraumatic 


Eyes:  normal inspection, EOMI, PERRL


Neck:  supple, Trachea midline


Respiratory/Chest: Normal breath sounds, CTA


Cardiovascular: S1, S2, No murmur


Abdomen/GI:Soft, Non tender, Bowel sounds present


Extremities/Musculoskelatal:normal inspection, no edema, RLE  in Surgical 

Bandage, +Drain 


Neurologic/Psych:AAOX3, grossly no focal neurological deficits 


Skin:  normal color, warm





Lab data as noted below.


ASSESSMENT & PLAN:





S/P R TKA POD Day # 1 


Pain control


Continue wound care 


PT/OT  


On ASA BID for DVT Px


Monitor H&H for post op anemia








[place]


CPAP QHS


Can use Nocturnal Oxygen as patient prefers not to use CPAP while hospitalized








Hyperlipidemia:


continue statin 








DVT Px:


ASA 81mg BID 








Disposition:


Per Primary Team


Vital Signs:











  Date Time  Temp Pulse Resp B/P (MAP) Pulse Ox O2 Delivery O2 Flow Rate FiO2


 


2/1/18 15:25 36.6 70 18 139/79 (99) 97 Room Air  


 


2/1/18 15:00      Room Air  


 


2/1/18 10:56  75 20 155/79 (104) 99 Room Air  


 


2/1/18 07:48 36.3 67 17 140/74 (96) 96 Room Air  


 


2/1/18 07:15      Room Air  


 


2/1/18 03:56 36.4 69 16 126/75 (92) 96 Room Air  


 


1/31/18 23:30      Room Air  


 


1/31/18 22:56 36.4 70 18 131/74 (93) 95 Room Air  








Lab Results:





Results Past 24 Hours








Test


  2/1/18


05:10 Range/Units


 


 


White Blood Count 14.44 4.8-10.8  K/uL


 


Red Blood Count 4.14 4.7-6.1  M/uL


 


Hemoglobin 12.3 14.0-18.0  g/dL


 


Hematocrit 36.1 42-52  %


 


Mean Corpuscular Volume 87.2   fL


 


Mean Corpuscular Hemoglobin 29.7 25-34  pg


 


Mean Corpuscular Hemoglobin


Concent 34.1


  32-36  g/dl


 


 


RDW Standard Deviation 44.4 36.4-46.3  fL


 


RDW Coefficient of Variation 14.1 11.5-14.5  %


 


Platelet Count 192 130-400  K/uL


 


Mean Platelet Volume 9.1 7.4-10.4  fL


 


Sodium Level 136 136-145  mmol/L


 


Potassium Level 4.3 3.5-5.1  mmol/L


 


Chloride Level 105   mmol/L


 


Carbon Dioxide Level 25 21-32  mmol/L


 


Anion Gap 6.0 3-11  mmol/L


 


Blood Urea Nitrogen 9 7-18  mg/dl


 


Creatinine


  0.81


  0.60-1.40


mg/dl


 


Est Creatinine Clear Calc


Drug Dose 117.8


   ml/min


 


 


Estimated GFR (


American) 114.3


   


 


 


Estimated GFR (Non-


American 98.7


   


 


 


BUN/Creatinine Ratio 11.2 10-20  


 


Random Glucose 164 70-99  mg/dl


 


Calcium Level 8.0 8.5-10.1  mg/dl

## 2018-02-01 NOTE — ANESTHESIOLOGY PROGRESS NOTE
Anesthesia Post Op Note


Date & Time


Feb 1, 2018 at 09:39





Vital Signs


Pain Intensity:  0.0





Vital Signs Past 12 Hours








  Date Time  Temp Pulse Resp B/P (MAP) Pulse Ox O2 Delivery O2 Flow Rate FiO2


 


2/1/18 07:48 36.3 67 17 140/74 (96) 96 Room Air  


 


2/1/18 07:15      Room Air  


 


2/1/18 03:56 36.4 69 16 126/75 (92) 96 Room Air  


 


1/31/18 23:30      Room Air  


 


1/31/18 22:56 36.4 70 18 131/74 (93) 95 Room Air  











Notes


Mental Status:  alert / awake / arousable, participated in evaluation


Pt Amnestic to Procedure:  Yes


Nausea / Vomiting:  adequately controlled


Pain:  adequately controlled


Airway Patency, RR, SpO2:  stable & adequate


BP & HR:  stable & adequate


Hydration State:  stable & adequate


Neuraxial Anesthesia:  sensory block resolved


Anesthetic Complications:  no major complications apparent

## 2018-02-02 VITALS
OXYGEN SATURATION: 99 % | TEMPERATURE: 97.88 F | SYSTOLIC BLOOD PRESSURE: 122 MMHG | HEART RATE: 70 BPM | DIASTOLIC BLOOD PRESSURE: 73 MMHG

## 2018-02-02 VITALS
TEMPERATURE: 98.06 F | DIASTOLIC BLOOD PRESSURE: 70 MMHG | HEART RATE: 76 BPM | OXYGEN SATURATION: 96 % | SYSTOLIC BLOOD PRESSURE: 142 MMHG

## 2018-02-02 VITALS
HEART RATE: 76 BPM | SYSTOLIC BLOOD PRESSURE: 142 MMHG | TEMPERATURE: 98.06 F | DIASTOLIC BLOOD PRESSURE: 70 MMHG | OXYGEN SATURATION: 96 %

## 2018-02-02 LAB
BUN SERPL-MCNC: 11 MG/DL (ref 7–18)
CALCIUM SERPL-MCNC: 8.3 MG/DL (ref 8.5–10.1)
CO2 SERPL-SCNC: 26 MMOL/L (ref 21–32)
CREAT SERPL-MCNC: 0.82 MG/DL (ref 0.6–1.4)
EOSINOPHIL NFR BLD AUTO: 179 K/UL (ref 130–400)
GLUCOSE SERPL-MCNC: 97 MG/DL (ref 70–99)
HCT VFR BLD CALC: 35.1 % (ref 42–52)
HGB BLD-MCNC: 11.8 G/DL (ref 14–18)
MCH RBC QN AUTO: 29.5 PG (ref 25–34)
MCHC RBC AUTO-ENTMCNC: 33.6 G/DL (ref 32–36)
MCV RBC AUTO: 87.8 FL (ref 80–100)
PMV BLD AUTO: 9 FL (ref 7.4–10.4)
POTASSIUM SERPL-SCNC: 3.8 MMOL/L (ref 3.5–5.1)
RED CELL DISTRIBUTION WIDTH CV: 14.4 % (ref 11.5–14.5)
RED CELL DISTRIBUTION WIDTH SD: 46.2 FL (ref 36.4–46.3)
SODIUM SERPL-SCNC: 137 MMOL/L (ref 136–145)
WBC # BLD AUTO: 6.66 K/UL (ref 4.8–10.8)

## 2018-02-02 RX ADMIN — CELECOXIB SCH MG: 200 CAPSULE ORAL at 08:18

## 2018-02-02 RX ADMIN — Medication SCH TAB: at 08:19

## 2018-02-02 RX ADMIN — Medication SCH MG: at 08:18

## 2018-02-02 RX ADMIN — DOCUSATE SODIUM SCH MG: 100 CAPSULE, LIQUID FILLED ORAL at 08:18

## 2018-02-02 RX ADMIN — HYDROCODONE BITARTRATE AND ACETAMINOPHEN PRN TAB: 5; 325 TABLET ORAL at 09:26

## 2018-02-02 RX ADMIN — PANTOPRAZOLE SCH MG: 40 TABLET, DELAYED RELEASE ORAL at 08:19

## 2018-02-02 NOTE — ORTHOPEDIC PROGRESS NOTE
Orthopedic Progress Note


Date of Service


Feb 2, 2018.





Subjective


Post OP Day:  2


Reports: feeling well, pain controlled w PO medications, Denies: complaints, 

chest pain, SOB, nausea / vomiting, light headedness, calf pain





Objective


calves soft nontender, N/V intact, capillary refill less than 2 sec., incision C

/D/I, A&O x3, toes mobile


Patient has mod swelling and bruising with benign superficial skin tears around 

silverlon edges


No erythema or drainage, no signs of infection.











  Date Time  Temp Pulse Resp B/P (MAP) Pulse Ox O2 Delivery O2 Flow Rate FiO2


 


2/2/18 06:18 36.6 70 16 122/73 (89) 99 Room Air  


 


2/1/18 23:15      Room Air  


 


2/1/18 22:53 36.7 67 16 122/70 (87) 98 Room Air  


 


2/1/18 15:25 36.6 70 18 139/79 (99) 97 Room Air  


 


2/1/18 15:00      Room Air  


 


2/1/18 10:56  75 20 155/79 (104) 99 Room Air  


 


2/1/18 07:48 36.3 67 17 140/74 (96) 96 Room Air  








Laboratory Results 24 Hours:











Test


  2/2/18


06:22


 


Hematocrit 35.1 % 


 


Hemoglobin 11.8 g/dL 











Assessment & Plan


Assessment:


POD #2, Right TKA


Plan:


PT/ OT


DVT proph- ASA


D/C planning- Home w OPPT today


As per medicine


D/C silverlon, use ABD's and paper tape, vaseline gauze on skin tears.








Inhouse Planning


Pain Management:  Celebrex, Norco, Morphine, PO Tylenol


DVT Prophylaxis:  TEDs, SCDs, ASA





Discharge Planning


Discharge Planning:  home with oppt


Pain Management:  Norco, PO Tylenol


DVT Prophylaxis:  TEDs, ASA


Therapy:  Physical Therapy, Occupational Therapy

## 2018-02-02 NOTE — DISCHARGE INSTRUCTIONS
Discharge Instructions


Date of Service


2018.





Admission


Reason for Admission:  Right Knee Degenerative Joint Disease





Discharge


Discharge Diagnosis / Problem:  Right TKA





Discharge Goals


Goal(s):  Improve function





Activity Recommendations


Activity Limitations:  as noted below





.





Instructions / Follow-Up


Instructions / Follow-Up


ACTIVITY RECOMMENDATIONS:





SELF CARE INSTRUCTIONS AFTER TOTAL KNEE REPLACEMENT





A.  You may need to continue a physical therapy program after discharge from 

the hospital.  There are several options available to you. 


      Your doctor will assist you in selecting the best one for you.





   1.  An out-patient facility 2 to 3 times a week for therapy or home therapy.


   2.  Continue working on all exercises taught to you in the hospital.  Your


                 goals should be to increase bending of your knee to 90 degrees 

and


                 beyond and to fully straighten your knee.





B.  You may progress at your own pace from walking with a walker or crutches to 

a cane; then to no assistive devices.





C.   Make walking a part of your daily routine.  Be up as much as comfortable 

with rest periods throughout the day.  


      Rest with leg elevation is very important. 


      Use the ice wrap frequently for the first 3-4 weeks.





D.  There are no restrictions on activities.  You may ride in a car, shop, 

participate in household chores and all social activities.





E.  Wear the long elastic stockings (CANDACE hose) 20 hours a day for 2 weeks after 

surgery.  


     They can be removed several times a day for laundering and for a bath.





F.  You may shower, no tub baths until cleared by your doctor.








SPECIAL CARE INSTRUCTIONS:





**VERY IMPORTANT TO READ AND REVIEW**





A.  There are a few signs you need to watch for after you are home.  Call  

[facility] if you notice any of the followin.  Increased severe knee pain.  Some pain is expected especially  when you 

exercise.


   2.  Increased swelling in your leg or knee; pain or swelling of the calf 

muscle in either lower leg.


   3.  Any fluid drainage from the incision.


   4.  Shortness of breath or chest pain.





B.  Please call [facility] at [phone] if you have any  

concerns or questions about your operation or recovery.  


     The doctor or his nurse will return your call promptly.





C.  You must take antibiotics before dental work, bladder, bowel or other 

surgery.  


      Your doctor will provide you with a permanent care to carry describing 

this precaution.





IMPORTANT:





*  REMEMBER TO TAKE ASPIRIN, 81 MG, TWICE DAILY FOR 4 WEEKS UNLESS OTHERWISE 

DIRECTED.  


   THIS IS YOUR BLOOD THINNER.





*  HIGH RISK PATIENTS MAY BE PRESCRIBED A STRONGER BLOOD THINNER.  


   THIS WILL BE PROVIDED AT DISCHARGE.





*  CALL IF INCREASED PAIN, REDNESS, DRAINAGE OR FEVER GREATER THAT 101.





*  WEAR CANDACE HOSE 20 HOURS PER DAY FOR 2 WEEKS.





*  YOU MAY HAVE A LARGE BAND-AID LIKE DRESSING (SILVERON).  THIS WILL  REMAIN 

ON YOUR INCISION FOR 7 DAYS, THEN CAN BE REMOVED. 


    IF INCISION IS LEAKING THROUGH DRESSING, CALL THE OFFICE [phone].








FOLLOW UP VISIT:





If appointment is not already scheduled:





Please call [facility] to make a follow-up appointment for 


2 weeks after your surgery at [phone].





PLEASE MAKE APPOINTMENT ONE WEEK 18 FOR SKIN CHECK.





Current Hospital Diet


Patient's current hospital diet: Regular Diet





Discharge Diet


Recommended Diet:  Regular Diet





Procedures


Procedures Performed:  


Right Total Knee Arthroplasty, Cemented





Pending Studies


Studies pending at discharge:  no





Laboratory Results





Hemoglobin A1c








Test


  18


14:42 Range/Units


 


 


Estimated Average Glucose 111   mg/dl


 


Hemoglobin A1c 5.5  4.5-5.6  %











Medical Emergencies








.


Who to Call and When:





Medical Emergencies:  If at any time you feel your situation is an emergency, 

please call [phone] immediately.





.





Non-Emergent Contact


Non-Emergency issues call your:  Primary Care Provider


.








"Provider Documentation" section prepared by Manuel Braun.








.





VTE Core Measure


Inpt VTE Proph given/why not?:  Other Anticoagulation (ASA), T.E.D. Stockings, 

SCD's





[place] Drug Monitoring Program


Search Results:  patient reviewed within database, no issues identified